# Patient Record
Sex: FEMALE | Race: WHITE | Employment: OTHER | URBAN - METROPOLITAN AREA
[De-identification: names, ages, dates, MRNs, and addresses within clinical notes are randomized per-mention and may not be internally consistent; named-entity substitution may affect disease eponyms.]

---

## 2017-01-04 ENCOUNTER — OFFICE VISIT (OUTPATIENT)
Dept: FAMILY MEDICINE CLINIC | Age: 67
End: 2017-01-04

## 2017-01-04 VITALS
SYSTOLIC BLOOD PRESSURE: 149 MMHG | RESPIRATION RATE: 16 BRPM | WEIGHT: 168 LBS | OXYGEN SATURATION: 96 % | HEART RATE: 66 BPM | TEMPERATURE: 98.3 F | HEIGHT: 63 IN | DIASTOLIC BLOOD PRESSURE: 82 MMHG | BODY MASS INDEX: 29.77 KG/M2

## 2017-01-04 DIAGNOSIS — R05.9 COUGH: Primary | ICD-10-CM

## 2017-01-04 RX ORDER — HYDROCODONE POLISTIREX AND CHLORPHENIRAMINE POLISTIREX 10; 8 MG/5ML; MG/5ML
5 SUSPENSION, EXTENDED RELEASE ORAL
Qty: 70 ML | Refills: 0 | Status: SHIPPED | OUTPATIENT
Start: 2017-01-04 | End: 2017-01-11

## 2017-01-04 NOTE — PATIENT INSTRUCTIONS
Take some Mucinex (guaifenisin) and check out the information below. Use the Tussionex as needed for cough, keeping in mind that it will likely make you drowsy. Cough: Care Instructions  Your Care Instructions  A cough is your body's response to something that bothers your throat or airways. Many things can cause a cough. You might cough because of a cold or the flu, bronchitis, or asthma. Smoking, postnasal drip, allergies, and stomach acid that backs up into your throat also can cause coughs. A cough is a symptom, not a disease. Most coughs stop when the cause, such as a cold, goes away. You can take a few steps at home to cough less and feel better. Follow-up care is a key part of your treatment and safety. Be sure to make and go to all appointments, and call your doctor if you are having problems. It's also a good idea to know your test results and keep a list of the medicines you take. How can you care for yourself at home? · Drink lots of water and other fluids. This helps thin the mucus and soothes a dry or sore throat. Honey or lemon juice in hot water or tea may ease a dry cough. · Take cough medicine as directed by your doctor. · Prop up your head on pillows to help you breathe and ease a dry cough. · Try cough drops to soothe a dry or sore throat. Cough drops don't stop a cough. Medicine-flavored cough drops are no better than candy-flavored drops or hard candy. · Do not smoke. Avoid secondhand smoke. If you need help quitting, talk to your doctor about stop-smoking programs and medicines. These can increase your chances of quitting for good. When should you call for help? Call 911 anytime you think you may need emergency care. For example, call if:  · You have severe trouble breathing. Call your doctor now or seek immediate medical care if:  · You cough up blood. · You have new or worse trouble breathing. · You have a new or higher fever. · You have a new rash.   Watch closely for changes in your health, and be sure to contact your doctor if:  · You cough more deeply or more often, especially if you notice more mucus or a change in the color of your mucus. · You have new symptoms, such as a sore throat, an earache, or sinus pain. · You do not get better as expected. Where can you learn more? Go to http://mynor-ulises.info/. Enter D279 in the search box to learn more about \"Cough: Care Instructions. \"  Current as of: May 27, 2016  Content Version: 11.1  © 7067-8675 [employer]. Care instructions adapted under license by [employer] (which disclaims liability or warranty for this information). If you have questions about a medical condition or this instruction, always ask your healthcare professional. [address] any warranty or liability for your use of this information. Saline Nasal Washes: Care Instructions  Your Care Instructions  Saline nasal washes help keep the nasal passages open by washing out thick or dried mucus. This simple remedy can help relieve symptoms of allergies, sinusitis, and colds. It also can make the nose feel more comfortable by keeping the mucous membranes moist. You may notice a little burning sensation in your nose the first few times you use the solution, but this usually gets better in a few days. Follow-up care is a key part of your treatment and safety. Be sure to make and go to all appointments, and call your doctor if you are having problems. It's also a good idea to know your test results and keep a list of the medicines you take. How can you care for yourself at home? · You can buy premixed saline solution in a squeeze bottle or other sinus rinse products at a drugstore. Read and follow the instructions on the label. · You also can make your own saline solution by adding 1 teaspoon of salt and 1 teaspoon of baking soda to 2 cups of distilled water.   · If you use a homemade solution, pour a small amount into a clean bowl. Using a rubber bulb syringe, squeeze the syringe and place the tip in the salt water. Pull a small amount of the salt water into the syringe by relaxing your hand. · Sit down with your head tilted slightly back. Do not lie down. Put the tip of the bulb syringe or the squeeze bottle a little way into one of your nostrils. Gently drip or squirt a few drops into the nostril. Repeat with the other nostril. Some sneezing and gagging are normal at first.  · Gently blow your nose. · Wipe the syringe or bottle tip clean after each use. · Repeat this 2 or 3 times a day. · Use nasal washes gently if you have nosebleeds often. When should you call for help? Watch closely for changes in your health, and be sure to contact your doctor if:  · You often get nosebleeds. · You have problems doing the nasal washes. Where can you learn more? Go to http://mynor-ulises.info/. Enter 504 [id number] 42 47 in the search box to learn more about \"Saline Nasal Washes: Care Instructions. \"  Current as of: July 29, 2016  Content Version: 11.1  © 3697-3325 [employer]. Care instructions adapted under license by [employer] (which disclaims liability or warranty for this information). If you have questions about a medical condition or this instruction, always ask your healthcare professional. [place] [address] any warranty or liability for your use of this information. DASH Diet: Care Instructions     Your Care Instructions   The DASH diet is an eating plan that can help lower your blood pressure. DASH stands for Dietary Approaches to Stop Hypertension. Hypertension is high blood pressure. The DASH diet focuses on eating foods that are high in calcium, potassium, and magnesium. These nutrients can lower blood pressure. The foods that are highest in these nutrients are fruits, vegetables, dairy products, nuts, seeds, and legumes.  But taking calcium, potassium, and magnesium supplements instead of eating foods that are high in those nutrients does not have the same effect. The DASH diet also includes whole grains, fish, and poultry. The DASH diet is one of several lifestyle changes your doctor may recommend to lower your high blood pressure. Your doctor may also want you to decrease the amount of sodium in your diet. Lowering sodium while following the DASH diet can lower blood pressure even further than just the DASH diet alone. Follow-up care is a key part of your treatment and safety. Be sure to make and go to all appointments, and call your doctor if you are having problems. It's also a good idea to know your test results and keep a list of the medicines you take. How can you care for yourself at home? Following the DASH diet   Eat 4 to 5 servings of fruit each day. A serving is 1 medium-sized piece of fruit, ½ cup chopped or canned fruit, 1/4 cup dried fruit, or 4 ounces (½ cup) of fruit juice. Choose whole fruit much more often than fruit juice. Eat 4 to 5 servings--or more--of vegetables each day. A serving is 1 cup of lettuce or raw leafy vegetables, ½ cup of chopped or cooked vegetables, or 4 ounces (½ cup) of vegetable juice. Choose vegetables more often than vegetable juice. Get 2 to 3 servings of dairy each day, if you tolerate dairy. A serving is 8 ounces of milk, 1 cup of yogurt, or 1 ½ ounces of cheese. Choose whole grains over refined grains if you eat grain-based foods (bread, rice, cereal, pasta). Beware of products labeled \"made with whole grains,\" as this is often a marker of over-processing. Choose products with the highest amount of fiber available. Eat 4 to 5 servings of nuts, seeds, and legumes (cooked dried beans, lentils, and split peas) each week. A serving is 1/3 cup of nuts, 2 tablespoons of seeds, or ½ cup of cooked beans or peas.    Avoid trans fats, hydrogenated oils, and industrial oils such as \"vegetable,\" soy, and canola. Extra virgin olive and coconut oils are great alternatives, as well as nut and seed oils such as flax, walnut, and avocado. Limit sweets and added sugars to 5 servings or less a week. A serving is 1 tablespoon jelly or jam, ½ cup sorbet, or 1 cup of lemonade. Maintain a sensible intake of sodium, keeping in mind that highly processed foods and restaurant foods tend to be high in sodium and low in potassium, magnesium, and calcium. It is important to maintain a balance of these nutrients. http://authoritynutrition.com/how-much-sodium-per-day/        Tips for success   Start small. Do not try to make dramatic changes to your diet all at once. You might feel that you are missing out on your favorite foods and then be more likely to not follow the plan. Make small changes, and stick with them. Once those changes become habit, add a few more changes. Try some of the following:   Make it a goal to eat a fruit or vegetable at every meal and at snacks. This will make it easy to get the recommended amount of fruits and vegetables each day. Try yogurt topped with fruit and nuts for a snack or healthy dessert. Add lettuce, tomato, cucumber, and onion to sandwiches. Combine a ready-made pizza crust with low-fat mozzarella cheese and lots of vegetable toppings. Try using tomatoes, squash, spinach, broccoli, carrots, cauliflower, and onions. Have a variety of cut-up vegetables with a low-fat dip as an appetizer instead of chips and dip. Sprinkle sunflower seeds or chopped almonds over salads. Or try adding chopped walnuts or almonds to cooked vegetables. Try some vegetarian meals using beans and peas. Add garbanzo or kidney beans to salads. Make burritos and tacos with mashed yancey beans or black beans.     http://authoritynutrition.com/how-much-sodium-per-day/

## 2017-01-04 NOTE — MR AVS SNAPSHOT
Visit Information Date & Time Provider Department Dept. Phone Encounter #  
 1/4/2017  2:00 PM Della Hester PA-C [address] [place], [place] [phone] [id number] Follow-up Instructions Return in about 4 weeks (around 2/1/2017), or if symptoms worsen or fail to improve, for blood pressure follow-up. Upcoming Health Maintenance Date Due DTaP/Tdap/Td series (1 - Tdap) 3/13/1973 ZOSTER VACCINE AGE 60> 3/13/2012 INFLUENZA AGE 9 TO ADULT 8/1/2016 BREAST CANCER SCRN MAMMOGRAM 7/13/2017 COLONOSCOPY 10/30/2017 Allergies as of 1/4/2017  Review Complete On: 1/4/2017 By: Danny Gonzalez LPN Severity Noted Reaction Type Reactions Omnicef [Cefdinir]  03/15/2016   Systemic Rash Current Immunizations  Never Reviewed No immunizations on file. Not reviewed this visit You Were Diagnosed With   
  
 Codes Comments Cough    -  Primary ICD-10-CM: N34 ICD-9-CM: 786.2 Elevated blood pressure     ICD-10-CM: I10 
ICD-9-CM: 401.9 Vitals BP Pulse Temp Resp Height(growth percentile) Weight(growth percentile) 149/82 (BP 1 Location: Left arm, BP Patient Position: Sitting) 66 98.3 °F (36.8 °C) (Oral) 16 5' 2.5\" (1.588 m) 168 lb (76.2 kg) SpO2 BMI OB Status Smoking Status 96% 30.24 kg/m2 Hysterectomy Former Smoker Vitals History BMI and BSA Data Body Mass Index Body Surface Area  
 30.24 kg/m 2 1.83 m 2 Preferred Pharmacy Pharmacy Name Phone [id number] [place] [phone] Your Updated Medication List  
  
   
This list is accurate as of: 1/4/17  2:38 PM.  Always use your most recent med list.  
  
  
  
  
 atorvastatin 40 mg tablet Commonly known as:  LIPITOR  
TAKE 1 TABLET BY MOUTH EVERY DAY AT BEDTIME  
  
 chlorpheniramine-HYDROcodone 10-8 mg/5 mL suspension Commonly known as:  Zygmunt Guardian Take 5 mL by mouth every twelve (12) hours as needed for Cough for up to 7 days. Max Daily Amount: 10 mL. EXCEDRIN MIGRAINE PO Take  by mouth. olopatadine 0.6 % Spry Commonly known as:  PATANASE 2 Squirts by Both Nostrils route two (2) times daily as needed (allergies). Prescriptions Printed Refills  
 chlorpheniramine-HYDROcodone (TUSSIONEX) 10-8 mg/5 mL suspension 0 Sig: Take 5 mL by mouth every twelve (12) hours as needed for Cough for up to 7 days. Max Daily Amount: 10 mL. Class: Print Route: Oral  
  
Follow-up Instructions Return in about 4 weeks (around 2/1/2017), or if symptoms worsen or fail to improve, for blood pressure follow-up. Patient Instructions Take some Mucinex (guaifenisin) and check out the information below. Use the Tussionex as needed for cough, keeping in mind that it will likely make you drowsy. Cough: Care Instructions Your Care Instructions A cough is your body's response to something that bothers your throat or airways. Many things can cause a cough. You might cough because of a cold or the flu, bronchitis, or asthma. Smoking, postnasal drip, allergies, and stomach acid that backs up into your throat also can cause coughs. A cough is a symptom, not a disease. Most coughs stop when the cause, such as a cold, goes away. You can take a few steps at home to cough less and feel better. Follow-up care is a key part of your treatment and safety. Be sure to make and go to all appointments, and call your doctor if you are having problems. It's also a good idea to know your test results and keep a list of the medicines you take. How can you care for yourself at home? · Drink lots of water and other fluids. This helps thin the mucus and soothes a dry or sore throat. Honey or lemon juice in hot water or tea may ease a dry cough. · Take cough medicine as directed by your doctor. · Prop up your head on pillows to help you breathe and ease a dry cough. · Try cough drops to soothe a dry or sore throat. Cough drops don't stop a cough. Medicine-flavored cough drops are no better than candy-flavored drops or hard candy. · Do not smoke. Avoid secondhand smoke. If you need help quitting, talk to your doctor about stop-smoking programs and medicines. These can increase your chances of quitting for good. When should you call for help? Call 911 anytime you think you may need emergency care. For example, call if: 
· You have severe trouble breathing. Call your doctor now or seek immediate medical care if: 
· You cough up blood. · You have new or worse trouble breathing. · You have a new or higher fever. · You have a new rash. Watch closely for changes in your health, and be sure to contact your doctor if: 
· You cough more deeply or more often, especially if you notice more mucus or a change in the color of your mucus. · You have new symptoms, such as a sore throat, an earache, or sinus pain. · You do not get better as expected. Where can you learn more? Go to http://mynor-ulises.info/. Enter D279 in the search box to learn more about \"Cough: Care Instructions. \" Current as of: May 27, 2016 Content Version: 11.1 © 7858-9140 [employer]. Care instructions adapted under license by [employer] (which disclaims liability or warranty for this information). If you have questions about a medical condition or this instruction, always ask your healthcare professional. [place] [address] any warranty or liability for your use of this information. Saline Nasal Washes: Care Instructions Your Care Instructions Saline nasal washes help keep the nasal passages open by washing out thick or dried mucus. This simple remedy can help relieve symptoms of allergies, sinusitis, and colds.  It also can make the nose feel more comfortable by keeping the mucous membranes moist. You may notice a little burning sensation in your nose the first few times you use the solution, but this usually gets better in a few days. Follow-up care is a key part of your treatment and safety. Be sure to make and go to all appointments, and call your doctor if you are having problems. It's also a good idea to know your test results and keep a list of the medicines you take. How can you care for yourself at home? · You can buy premixed saline solution in a squeeze bottle or other sinus rinse products at a drugstore. Read and follow the instructions on the label. · You also can make your own saline solution by adding 1 teaspoon of salt and 1 teaspoon of baking soda to 2 cups of distilled water. · If you use a homemade solution, pour a small amount into a clean bowl. Using a rubber bulb syringe, squeeze the syringe and place the tip in the salt water. Pull a small amount of the salt water into the syringe by relaxing your hand. · Sit down with your head tilted slightly back. Do not lie down. Put the tip of the bulb syringe or the squeeze bottle a little way into one of your nostrils. Gently drip or squirt a few drops into the nostril. Repeat with the other nostril. Some sneezing and gagging are normal at first. 
· Gently blow your nose. · Wipe the syringe or bottle tip clean after each use. · Repeat this 2 or 3 times a day. · Use nasal washes gently if you have nosebleeds often. When should you call for help? Watch closely for changes in your health, and be sure to contact your doctor if: 
· You often get nosebleeds. · You have problems doing the nasal washes. Where can you learn more? Go to http://mynor-ulises.info/. Enter [id number] in the search box to learn more about \"Saline Nasal Washes: Care Instructions. \" Current as of: July 29, 2016 Content Version: 11.1 © 4446-4140 [employer]Â®.  Care instructions adapted under license by [facility] [address] (which disclaims liability or warranty for this information). If you have questions about a medical condition or this instruction, always ask your healthcare professional. [address] any warranty or liability for your use of this information. DASH Diet: Care Instructions Your Care Instructions The DASH diet is an eating plan that can help lower your blood pressure. DASH stands for Dietary Approaches to Stop Hypertension. Hypertension is high blood pressure. The DASH diet focuses on eating foods that are high in calcium, potassium, and magnesium. These nutrients can lower blood pressure. The foods that are highest in these nutrients are fruits, vegetables, dairy products, nuts, seeds, and legumes. But taking calcium, potassium, and magnesium supplements instead of eating foods that are high in those nutrients does not have the same effect. The DASH diet also includes whole grains, fish, and poultry. The DASH diet is one of several lifestyle changes your doctor may recommend to lower your high blood pressure. Your doctor may also want you to decrease the amount of sodium in your diet. Lowering sodium while following the DASH diet can lower blood pressure even further than just the DASH diet alone. Follow-up care is a key part of your treatment and safety. Be sure to make and go to all appointments, and call your doctor if you are having problems. It's also a good idea to know your test results and keep a list of the medicines you take. How can you care for yourself at home? Following the DASH diet Eat 4 to 5 servings of fruit each day. A serving is 1 medium-sized piece of fruit, ½ cup chopped or canned fruit, 1/4 cup dried fruit, or 4 ounces (½ cup) of fruit juice. Choose whole fruit much more often than fruit juice. Eat 4 to 5 servings--or more--of vegetables each day.  A serving is 1 cup of lettuce or raw leafy vegetables, ½ cup of chopped or cooked vegetables, or 4 ounces (½ cup) of vegetable juice. Choose vegetables more often than vegetable juice. Get 2 to 3 servings of dairy each day, if you tolerate dairy. A serving is 8 ounces of milk, 1 cup of yogurt, or 1 ½ ounces of cheese. Choose whole grains over refined grains if you eat grain-based foods (bread, rice, cereal, pasta). Beware of products labeled \"made with whole grains,\" as this is often a marker of over-processing. Choose products with the highest amount of fiber available. Eat 4 to 5 servings of nuts, seeds, and legumes (cooked dried beans, lentils, and split peas) each week. A serving is 1/3 cup of nuts, 2 tablespoons of seeds, or ½ cup of cooked beans or peas. Avoid trans fats, hydrogenated oils, and industrial oils such as \"vegetable,\" soy, and canola. Extra virgin olive and coconut oils are great alternatives, as well as nut and seed oils such as flax, walnut, and avocado. Limit sweets and added sugars to 5 servings or less a week. A serving is 1 tablespoon jelly or jam, ½ cup sorbet, or 1 cup of lemonade. Maintain a sensible intake of sodium, keeping in mind that highly processed foods and restaurant foods tend to be high in sodium and low in potassium, magnesium, and calcium. It is important to maintain a balance of these nutrients. http://authoritynutrition.com/how-much-sodium-per-day/ 
 
 
 
Tips for success Start small. Do not try to make dramatic changes to your diet all at once. You might feel that you are missing out on your favorite foods and then be more likely to not follow the plan. Make small changes, and stick with them. Once those changes become habit, add a few more changes. Try some of the following:  
Make it a goal to eat a fruit or vegetable at every meal and at snacks. This will make it easy to get the recommended amount of fruits and vegetables each day. Try yogurt topped with fruit and nuts for a snack or healthy dessert. Add lettuce, tomato, cucumber, and onion to sandwiches. Combine a ready-made pizza crust with low-fat mozzarella cheese and lots of vegetable toppings. Try using tomatoes, squash, spinach, broccoli, carrots, cauliflower, and onions. Have a variety of cut-up vegetables with a low-fat dip as an appetizer instead of chips and dip. Sprinkle sunflower seeds or chopped almonds over salads. Or try adding chopped walnuts or almonds to cooked vegetables. Try some vegetarian meals using beans and peas. Add garbanzo or kidney beans to salads. Make burritos and tacos with mashed yancey beans or black beans. http://authorityBlueshift International Materials.com/how-much-sodium-per-day/ 
 
 
  
Introducing [facility]! Dear Mehdi Rubalcavaost: Thank you for requesting a [employer] account. Our records indicate that you already have an active [employer] account. You can access your account anytime at [email or web address] Did you know that you can access your hospital and ER discharge instructions at any time in [employer]? You can also review all of your test results from your hospital stay or ER visit. Additional Information If you have questions, please visit the Frequently Asked Questions section of the [employer] website at [email or web address] Remember, [employer] is NOT to be used for urgent needs. For medical emergencies, dial 911. Now available from your iPhone and Android! Please provide this summary of care documentation to your next provider. Your primary care clinician is listed as Bo Barr. If you have any questions after today's visit, please call [phone].

## 2017-01-04 NOTE — PROGRESS NOTES
HISTORY OF PRESENT ILLNESS  Eboni Gardner is a 61 y.o. female. HPI Comments: Pt presents with c/o 2 days of cough. 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 She has green nasal drainage. The cough is not productive. Cough   Associated symptoms include shortness of breath (with exertion, chronic). Pertinent negatives include no chest pain and no headaches. Review of Systems   Constitutional: Positive for malaise/fatigue. Negative for chills and fever. HENT: Positive for congestion. Negative for ear pain, sore throat and tinnitus. Eyes: Negative for pain. Respiratory: Positive for cough and shortness of breath (with exertion, chronic). Cardiovascular: Negative for chest pain and palpitations. Gastrointestinal: Positive for diarrhea (yesterday). Negative for nausea and vomiting. Genitourinary: Negative for dysuria and frequency. Musculoskeletal: Negative for joint pain and myalgias. Neurological: Positive for weakness. Negative for dizziness and headaches. Visit Vitals    /82 (BP 1 Location: Left arm, BP Patient Position: Sitting)    Pulse 66    Temp 98.3 °F (36.8 °C) (Oral)    Resp 16    Ht 5' 2.5\" (1.588 m)    Wt 168 lb (76.2 kg)    SpO2 96%    BMI 30.24 kg/m2       Physical Exam   Constitutional: She is oriented to person, place, and time. She appears well-developed and well-nourished. She is cooperative. She does not appear ill. No distress. BP elevated   HENT:   Head: Normocephalic and atraumatic. Right Ear: External ear and ear canal normal.   Left Ear: External ear and ear canal normal.   Nose: Nose normal. No mucosal edema or rhinorrhea. Right sinus exhibits no maxillary sinus tenderness and no frontal sinus tenderness. Left sinus exhibits no maxillary sinus tenderness and no frontal sinus tenderness.    Mouth/Throat: Uvula is midline, oropharynx is clear and moist and mucous membranes are normal.   TMs obscured by cerumen   Eyes: Conjunctivae are normal.   Neck: Neck supple. Cardiovascular: Normal rate, regular rhythm and normal heart sounds. Exam reveals no gallop and no friction rub. Pulses:       Radial pulses are 2+ on the right side, and 2+ on the left side. Pulmonary/Chest: Effort normal and breath sounds normal. She has no decreased breath sounds. She has no wheezes. She has no rhonchi. She has no rales. Lymphadenopathy:     She has no cervical adenopathy. Neurological: She is alert and oriented to person, place, and time. Skin: Skin is warm and dry. Psychiatric: She has a normal mood and affect. Her speech is normal and behavior is normal. Thought content normal.   Nursing note and vitals reviewed. ASSESSMENT and PLAN    ICD-10-CM ICD-9-CM    1. Cough R05 786.2 chlorpheniramine-HYDROcodone (TUSSIONEX) 10-8 mg/5 mL suspension   2. Elevated blood pressure I10 401.9      Recommendations: Take some Mucinex (guaifenisin) and check out the information below. Use the Tussionex as needed for cough, keeping in mind that it will likely make you drowsy. Provided after-visit information on  Cough, Saline Nasal Washes, and DASH diet  Reviewed reasons to return or seek emergency care. Pt verbalized understanding and agreement with the plan of care.     Edgard Rose PA-C

## 2017-01-24 ENCOUNTER — TELEPHONE (OUTPATIENT)
Dept: FAMILY MEDICINE CLINIC | Age: 67
End: 2017-01-24

## 2017-01-24 RX ORDER — OLOPATADINE HYDROCHLORIDE 665 UG/1
2 SPRAY NASAL
Qty: 1 BOTTLE | Refills: 5 | Status: SHIPPED | OUTPATIENT
Start: 2017-01-24 | End: 2017-01-24

## 2017-01-24 RX ORDER — AZELASTINE HCL 205.5 UG/1
1 SPRAY NASAL 2 TIMES DAILY
Qty: 1 BOTTLE | Refills: 3 | Status: SHIPPED | OUTPATIENT
Start: 2017-01-24 | End: 2018-01-03

## 2017-02-20 RX ORDER — ATORVASTATIN CALCIUM 40 MG/1
TABLET, FILM COATED ORAL
Qty: 90 TAB | Refills: 1 | Status: SHIPPED | OUTPATIENT
Start: 2017-02-20 | End: 2017-08-16 | Stop reason: SDUPTHER

## 2017-04-10 ENCOUNTER — OFFICE VISIT (OUTPATIENT)
Dept: FAMILY MEDICINE CLINIC | Age: 67
End: 2017-04-10

## 2017-04-10 VITALS
BODY MASS INDEX: 30.48 KG/M2 | HEART RATE: 54 BPM | WEIGHT: 172 LBS | SYSTOLIC BLOOD PRESSURE: 143 MMHG | TEMPERATURE: 97.1 F | DIASTOLIC BLOOD PRESSURE: 67 MMHG | RESPIRATION RATE: 16 BRPM | HEIGHT: 63 IN | OXYGEN SATURATION: 97 %

## 2017-04-10 DIAGNOSIS — Z00.00 ROUTINE GENERAL MEDICAL EXAMINATION AT A HEALTH CARE FACILITY: Primary | ICD-10-CM

## 2017-04-10 DIAGNOSIS — Z13.31 SCREENING FOR DEPRESSION: ICD-10-CM

## 2017-04-10 DIAGNOSIS — Z78.0 ASYMPTOMATIC MENOPAUSAL STATE: ICD-10-CM

## 2017-04-10 DIAGNOSIS — Z71.89 ADVANCED CARE PLANNING/COUNSELING DISCUSSION: ICD-10-CM

## 2017-04-10 NOTE — MR AVS SNAPSHOT
Visit Information Date & Time Provider Department Dept. Phone Encounter #  
 4/10/2017  8:30 AM Wilber Simpson MD [facility] [phone] [id number] Follow-up Instructions Return if symptoms worsen or fail to improve. Upcoming Health Maintenance Date Due DTaP/Tdap/Td series (1 - Tdap) 3/13/1973 ZOSTER VACCINE AGE 60> 3/13/2012 INFLUENZA AGE 9 TO ADULT 8/1/2016 OSTEOPOROSIS SCREENING (DEXA) 3/13/2017 Pneumococcal 65+ Low/Medium Risk (1 of 2 - PCV13) 3/13/2017 MEDICARE YEARLY EXAM 3/13/2017 BREAST CANCER SCRN MAMMOGRAM 7/13/2017 COLONOSCOPY 10/30/2017 GLAUCOMA SCREENING Q2Y 7/13/2018 Allergies as of 4/10/2017  Review Complete On: 4/10/2017 By: Jaydon Finn LPN Severity Noted Reaction Type Reactions Omnicef [Cefdinir]  03/15/2016   Systemic Rash Current Immunizations  Never Reviewed No immunizations on file. Not reviewed this visit You Were Diagnosed With   
  
 Codes Comments Routine general medical examination at a health care facility    -  Primary ICD-10-CM: Z00.00 ICD-9-CM: V70.0 Asymptomatic menopausal state     ICD-10-CM: Z78.0 ICD-9-CM: V49.81 Advanced care planning/counseling discussion     ICD-10-CM: Z71.89 ICD-9-CM: V65.49 Screening for depression     ICD-10-CM: Z13.89 ICD-9-CM: V79.0 Vitals BP Pulse Temp Resp Height(growth percentile) Weight(growth percentile) 143/67 (BP 1 Location: Right arm, BP Patient Position: Sitting) (!) 54 97.1 °F (36.2 °C) (Oral) 16 5' 2.5\" (1.588 m) 172 lb (78 kg) SpO2 BMI OB Status Smoking Status 97% 30.96 kg/m2 Hysterectomy Former Smoker Vitals History BMI and BSA Data Body Mass Index Body Surface Area 30.96 kg/m 2 1.85 m 2 Preferred Pharmacy Pharmacy Name Phone [id number], [address] [phone] Your Updated Medication List  
  
   
This list is accurate as of: 4/10/17  8:53 AM.  Always use your most recent med list.  
  
  
  
  
 atorvastatin 40 mg tablet Commonly known as:  LIPITOR  
TAKE 1 TABLET BY MOUTH EVERY DAY AT BEDTIME Azelastine 0.15 % (205.5 mcg) nasal spray Commonly known as:  ASTEPRO  
1 Spray by Both Nostrils route two (2) times a day. Indications: PERENNIAL ALLERGIC RHINITIS  
  
 EXCEDRIN MIGRAINE PO Take  by mouth. We Performed the Following [id number] [facility] [ [facility]] DC EKG, SCREEN, INIT PREV EXAM W/ INTERP/REPORT [ [facility]] Follow-up Instructions Return if symptoms worsen or fail to improve. To-Do List   
 04/13/2017 Imaging:  DEXA BONE DENSITY APPENDICULAR Referral Information Referral ID Referred By Referred To  
  
 [id number] Anne ROMERO Not Available Visits Status Start Date End Date 1 New Request 4/10/17 4/10/18 If your referral has a status of pending review or denied, additional information will be sent to support the outcome of this decision. Patient Instructions Get your living will done and get us a copy. I have scheduled a bone density test for you, someone will contact you. You will be due for a mammogram in July and a colonoscopy in October. . 
 
  
Introducing [facility]! Dear Shannan Marie: Thank you for requesting a [employer] account. Our records indicate that you already have an active [employer] account. You can access your account anytime at [email or web address] Did you know that you can access your hospital and ER discharge instructions at any time in [employer]? You can also review all of your test results from your hospital stay or ER visit. Additional Information If you have questions, please visit the Frequently Asked Questions section of the [employer] website at [email or web address] Remember, MyChart is NOT to be used for urgent needs. For medical emergencies, dial 911. Now available from your iPhone and Android! Please provide this summary of care documentation to your next provider. Your primary care clinician is listed as Bo Barr. If you have any questions after today's visit, please call [phone].

## 2017-04-10 NOTE — ACP (ADVANCE CARE PLANNING)
Discussed the importance of having an Advanced Care plan in writing (and in the electronic medical record), and the time to do it is now, when one is able to make the necessary decisions. A copy of the Advance Medical Directive booklet was given to the patient.

## 2017-04-10 NOTE — PROGRESS NOTES
Rm 1  Pt presents to the clinic for a AWV. Flu shot requested: no    Depression Screening Completed: yes    Learning Assessment Completed: yes     Abuse Screening Completed: yes    Health Maintenance reviewed and discussed per provider: yes     Coordination of Care:    1. Have you been to the ER, urgent care clinic since your last visit? Hospitalized since your last visit? no    2. Have you seen or consulted any other health care providers outside of the [address] [place] [address] since your last visit? Include any pap smears or colon screening.  no

## 2017-04-10 NOTE — PATIENT INSTRUCTIONS
Get your living will done and get us a copy. I have scheduled a bone density test for you, someone will contact you. You will be due for a mammogram in July and a colonoscopy in October. Sis Enrique

## 2017-04-10 NOTE — PROGRESS NOTES
Erika Marshall is a 74 y.o. female and presents for annual Medicare Wellness Visit. She is willing to get any health maintenance procedures done, but refuses any and all vaccines. Her mammogram will be due in July, and her colonoscopy in October. Problem List: Reviewed with patient and discussed risk factors. Patient Active Problem List   Diagnosis Code    Hyperlipidemia E78.5    Migraine headache G43.909    Vertigo R42    Microhematuria R31.29       Current medical providers:  Patient Care Team:  Baron Suggs MD as PCP - General (Family Practice)    PSH: Reviewed with patient  Past Surgical History:   Procedure Laterality Date    HX APPENDECTOMY      HX HEENT      wisdom teeth and molar extraction    HX HYSTERECTOMY      partial     HX TONSILLECTOMY          SH: Reviewed with patient  Social History   Substance Use Topics    Smoking status: Former Smoker     Quit date: 1/1/2007    Smokeless tobacco: Never Used    Alcohol use No       FH: Reviewed with patient  Family History   Problem Relation Age of Onset   [facility] Arthritis-osteo Mother     Diabetes Mother     Elevated Lipids Mother     Hypertension Mother     Stroke Mother     Alcohol abuse Father     Cancer Maternal Aunt        Medications/Allergies: Reviewed with patient  Current Outpatient Prescriptions on File Prior to Visit   Medication Sig Dispense Refill    atorvastatin (LIPITOR) 40 mg tablet TAKE 1 TABLET BY MOUTH EVERY DAY AT BEDTIME 90 Tab 1    Azelastine (ASTEPRO) 0.15 % (205.5 mcg) nasal spray 1 [place] by Both Nostrils route two (2) times a day. Indications: PERENNIAL ALLERGIC RHINITIS 1 Bottle 3    ASPIRIN/ACETAMINOPHEN/CAFFEINE (EXCEDRIN MIGRAINE PO) Take  by mouth. No current facility-administered medications on file prior to visit.        Allergies   Allergen Reactions    Omnicef [Cefdinir] Rash       Objective:  Visit Vitals    /67 (BP 1 Location: Right arm, BP Patient Position: Sitting)    Pulse (!) 54  Temp 97.1 °F (36.2 °C) (Oral)    Resp 16    Ht 5' 2.5\" (1.588 m)    Wt 172 lb (78 kg)    SpO2 97%    BMI 30.96 kg/m2    Body mass index is 30.96 kg/(m^2). Assessment of cognitive impairment: Alert and oriented x 3    Depression Screen:   PHQ 2 / 9, over the last two weeks 4/10/2017   Little interest or pleasure in doing things Not at all   Feeling down, depressed or hopeless Not at all   Total Score PHQ 2 0       Fall Risk Assessment:    Fall Risk Assessment, last 12 mths 4/10/2017   Able to walk? Yes   Fall in past 12 months? No       Functional Ability:   Does the patient exhibit a steady gait? yes   How long did it take the patient to get up and walk from a sitting position? 3 secs   Is the patient self reliant?  (ie can do own laundry, meals, household chores)  yes     Does the patient handle his/her own medications? yes     Does the patient handle his/her own money? yes     Is the patients home safe (ie good lighting, handrails on stairs and bath, etc.)? yes     Did you notice or did patient express any hearing difficulties? no     Did you notice or did patient express any vision difficulties?   no     Were distance and reading eye charts used? no       Advance Care Planning:   Patient was offered the opportunity to discuss advance care planning:  yes     Does patient have an Advance Directive:  no   If no, did you provide information on Caring Connections? yes       Plan:      No orders of the defined types were placed in this encounter.       Health Maintenance   Topic Date Due    DTaP/Tdap/Td series (1 - Tdap) 03/13/1973    ZOSTER VACCINE AGE 60>  03/13/2012    INFLUENZA AGE 9 TO ADULT  08/01/2016    GLAUCOMA SCREENING Q2Y  03/13/2017    OSTEOPOROSIS SCREENING (DEXA)  03/13/2017    Pneumococcal 65+ Low/Medium Risk (1 of 2 - PCV13) 03/13/2017    MEDICARE YEARLY EXAM  03/13/2017    BREAST CANCER SCRN MAMMOGRAM  07/13/2017    COLONOSCOPY  10/30/2017    Hepatitis C Screening Completed       *Patient verbalized understanding and agreement with the plan. A copy of the After Visit Summary with personalized health plan was given to the patient today. EKG: non-specific changes, no acute changes. I will get a bone scan, and then the mammogram and colonoscopy when due. She wants to get an Advanced Medical Directive drawn up. Despite my urging, she refused any injections.

## 2017-04-19 ENCOUNTER — HOSPITAL ENCOUNTER (OUTPATIENT)
Dept: GENERAL RADIOLOGY | Age: 67
Discharge: HOME | End: 2017-04-19
Attending: FAMILY MEDICINE
Payer: COMMERCIAL

## 2017-04-19 DIAGNOSIS — Z78.0 ASYMPTOMATIC MENOPAUSAL STATE: ICD-10-CM

## 2017-04-19 PROCEDURE — 77081 DXA BONE DENSITY APPENDICULR: CPT

## 2017-04-20 ENCOUNTER — TELEPHONE (OUTPATIENT)
Dept: FAMILY MEDICINE CLINIC | Age: 67
End: 2017-04-20

## 2017-04-20 RX ORDER — ALENDRONATE SODIUM 70 MG/1
70 TABLET ORAL
Qty: 12 TAB | Refills: 3 | Status: SHIPPED | OUTPATIENT
Start: 2017-04-20 | End: 2018-03-18 | Stop reason: SDUPTHER

## 2017-04-20 NOTE — PROGRESS NOTES
Called patient to inform her Dr. Myrtie Siemens reviewed her bone density scan results and it indicated she has osteopenia, thinning of her bones. Patient was advised Dr. Myrtie Siemens recommends a once weekly supplement. Patient verbalized understanding and had no further questions.

## 2017-05-16 ENCOUNTER — TELEPHONE (OUTPATIENT)
Dept: FAMILY MEDICINE CLINIC | Age: 67
End: 2017-05-16

## 2017-05-16 ENCOUNTER — OFFICE VISIT (OUTPATIENT)
Dept: FAMILY MEDICINE CLINIC | Age: 67
End: 2017-05-16

## 2017-05-16 VITALS
BODY MASS INDEX: 30.3 KG/M2 | TEMPERATURE: 97.1 F | RESPIRATION RATE: 18 BRPM | HEART RATE: 50 BPM | OXYGEN SATURATION: 98 % | WEIGHT: 171 LBS | SYSTOLIC BLOOD PRESSURE: 137 MMHG | DIASTOLIC BLOOD PRESSURE: 76 MMHG | HEIGHT: 63 IN

## 2017-05-16 DIAGNOSIS — M79.671 PAIN IN BOTH FEET: ICD-10-CM

## 2017-05-16 DIAGNOSIS — Z13.31 SCREENING FOR DEPRESSION: ICD-10-CM

## 2017-05-16 DIAGNOSIS — M72.2 PLANTAR FASCIITIS: Primary | ICD-10-CM

## 2017-05-16 DIAGNOSIS — R06.02 SHORTNESS OF BREATH: ICD-10-CM

## 2017-05-16 DIAGNOSIS — M79.672 PAIN IN BOTH FEET: ICD-10-CM

## 2017-05-16 RX ORDER — MELOXICAM 15 MG/1
15 TABLET ORAL
Qty: 30 TAB | Refills: 1 | Status: SHIPPED | OUTPATIENT
Start: 2017-05-16 | End: 2017-07-11 | Stop reason: SDUPTHER

## 2017-05-16 NOTE — PROGRESS NOTES
Rm 2  Pt presents to the clinic complaining of left foot pan. Pt also complains of right foot pain occasionally but the left one hurts worse. Depression Screening Completed: yes    Learning Assessment Completed: yes    Abuse Screening Completed: yes    Health Maintenance reviewed and discussed per provider: yes      Coordination of Care:    1. Have you been to the ER, urgent care clinic since your last visit? Hospitalized since your last visit? no    2. Have you seen or consulted any other health care providers outside of the [address] [place] [address] since your last visit? Include any pap smears or colon screening.  no

## 2017-05-16 NOTE — TELEPHONE ENCOUNTER
Called patient back. Patient stated she is having pain in her heels. Patient stated it feels like she is \"walking on bones\". Patient was advised Dr. Bora Perez recommends an office visit because he can possibly treat her after an evaluation. Patient verbalized understanding and was transferred to the [facility] to schedule an appointment.

## 2017-05-16 NOTE — TELEPHONE ENCOUNTER
Pt is requesting a callback.    Pain in foot unsure if needs to be seen at [facility] or referral for podiatrist.

## 2017-05-16 NOTE — PROGRESS NOTES
HISTORY OF PRESENT ILLNESS  Prashanth Lehman is a 74 y.o. female. HPI Comments: Ms. Aisha Ang is here because of bilateral heel pain for the last couple of months, The L is a lot worse than the R. Pain is present after activity, ok at rest. No known injury. She also mentions that the SOB that she told me about last summer is still present. The symptoms are after exertion (walking around the yard, during sexual relations, etc). She denies chest pain when she gets SOB. She has not smoked for 10 years. Foot Pain   Associated symptoms include shortness of breath. Pertinent negatives include no chest pain, no abdominal pain and no headaches. Review of Systems   Constitutional: Negative for chills and fever. HENT: Negative for sore throat. Eyes: Negative for blurred vision and double vision. Respiratory: Positive for shortness of breath. Negative for cough, hemoptysis, sputum production and wheezing. Cardiovascular: Negative for chest pain and palpitations. Gastrointestinal: Negative for abdominal pain, nausea and vomiting. Musculoskeletal:        Heel pain   Neurological: Negative for headaches. Physical Exam   Constitutional: Vital signs are normal. She appears well-developed and well-nourished. HENT:   Right Ear: Tympanic membrane and ear canal normal.   Left Ear: Tympanic membrane and ear canal normal.   Nose: Nose normal.   Mouth/Throat: Uvula is midline, oropharynx is clear and moist and mucous membranes are normal.   Eyes: Pupils are equal, round, and reactive to light. Cardiovascular: Normal rate and regular rhythm. Pulmonary/Chest: Effort normal and breath sounds normal. No respiratory distress. She has no rales. Abdominal: She exhibits no distension. Musculoskeletal:   Very tender medial L heel, mildly tender medial R heel. No swelling or bruising. Skin: No rash noted. Nursing note and vitals reviewed.       ASSESSMENT and PLAN    ICD-10-CM ICD-9-CM    1. Plantar fasciitis M72.2 728.71 meloxicam (MOBIC) 15 mg tablet      XR CALCANEUS LT      XR CALCANEUS RT   2. Pain in both feet M79.671 729.5 meloxicam (MOBIC) 15 mg tablet    M79.672  XR CALCANEUS LT      XR CALCANEUS RT   3.  Shortness of breath R06.02 786.05 REFERRAL TO PULMONARY DISEASE       AVS instructions reviewed with patient, pt verbalized understanding

## 2017-05-16 NOTE — MR AVS SNAPSHOT
Visit Information Date & Time Provider Department Dept. Phone Encounter #  
 5/16/2017 11:00 AM Helen Valadez, [facility] [address] [phone] [id number] Upcoming Health Maintenance Date Due DTaP/Tdap/Td series (1 - Tdap) 3/13/1973 ZOSTER VACCINE AGE 60> 3/13/2012 Pneumococcal 65+ Low/Medium Risk (1 of 2 - PCV13) 3/13/2017 BREAST CANCER SCRN MAMMOGRAM 7/13/2017 COLONOSCOPY 10/30/2017 INFLUENZA AGE 9 TO ADULT 8/1/2017 MEDICARE YEARLY EXAM 4/11/2018 GLAUCOMA SCREENING Q2Y 7/13/2018 Allergies as of 5/16/2017  Review Complete On: 5/16/2017 By: Helen Valadez MD  
  
 Severity Noted Reaction Type Reactions Omnicef [Cefdinir]  03/15/2016   Systemic Rash Current Immunizations  Never Reviewed No immunizations on file. Not reviewed this visit You Were Diagnosed With   
  
 Codes Comments Plantar fasciitis    -  Primary ICD-10-CM: M72.2 ICD-9-CM: 728.71 Pain in both feet     ICD-10-CM: M79.671, C46.264 ICD-9-CM: 729.5 Vitals BP Pulse Temp Resp Height(growth percentile) Weight(growth percentile)  
 137/76 (BP 1 Location: Left arm, BP Patient Position: Sitting) (!) 50 97.1 °F (36.2 °C) (Oral) 18 5' 2.5\" (1.588 m) 171 lb (77.6 kg) SpO2 BMI OB Status Smoking Status 98% 30.78 kg/m2 Hysterectomy Former Smoker Vitals History BMI and BSA Data Body Mass Index Body Surface Area 30.78 kg/m 2 1.85 m 2 Preferred Pharmacy Pharmacy Name Phone [id number] [address] [phone] Your Updated Medication List  
  
   
This list is accurate as of: 5/16/17 11:37 AM.  Always use your most recent med list.  
  
  
  
  
 alendronate 70 mg tablet Commonly known as:  FOSAMAX Take 1 Tab by mouth every seven (7) days. atorvastatin 40 mg tablet Commonly known as:  LIPITOR TAKE 1 TABLET BY MOUTH EVERY DAY AT BEDTIME Azelastine 0.15 % (205.5 mcg) nasal spray Commonly known as:  ASTEPRO  
1 Spray by Both Nostrils route two (2) times a day. Indications: PERENNIAL ALLERGIC RHINITIS  
  
 EXCEDRIN MIGRAINE PO Take  by mouth.  
  
 meloxicam 15 mg tablet Commonly known as:  MOBIC Take 1 Tab by mouth daily as needed (inflammation). Prescriptions Sent to Pharmacy Refills  
 meloxicam (MOBIC) 15 mg tablet 1 Sig: Take 1 Tab by mouth daily as needed (inflammation). Class: Normal  
 Pharmacy: [address] [place] [address] #: [phone] Route: Oral  
  
To-Do List   
 05/16/2017 Imaging:  XR CALCANEUS LT   
  
 05/16/2017 Imaging:  XR CALCANEUS RT Patient Instructions Take the anti-inflammatory medication as prescribed. Do the foot exercises daily religiously. Apply ice to the heels after overuse (long periods of being on your feet, after exercise, etc). Notify me if it doesn't go away. Plantar Fasciitis: Care Instructions Your Care Instructions Plantar fasciitis is pain and inflammation of the plantar fascia, the tissue at the bottom of your foot that connects the heel bone to the toes. The plantar fascia also supports the arch. If you strain the plantar fascia, it can develop small tears and cause heel pain when you stand or walk. Plantar fasciitis can be caused by running or other sports. It also may occur in people who are overweight or who have high arches or flat feet. You may get plantar fasciitis if you walk or stand for long periods, or have a tight Achilles tendon or calf muscles. You can improve your foot pain with rest and other care at home. It might take a few weeks to a few months for your foot to heal completely. Follow-up care is a key part of your treatment and safety.  Be sure to make and go to all appointments, and call your doctor if you are having problems. It's also a good idea to know your test results and keep a list of the medicines you take. How can you care for yourself at home? · Rest your feet often. Reduce your activity to a level that lets you avoid pain. If possible, do not run or walk on hard surfaces. · Take pain medicines exactly as directed. ¨ If the doctor gave you a prescription medicine for pain, take it as prescribed. ¨ If you are not taking a prescription pain medicine, take an over-the-counter anti-inflammatory medicine for pain and swelling, such as ibuprofen (Advil, Motrin) or naproxen (Aleve). Read and follow all instructions on the label. · Use ice massage to help with pain and swelling. You can use an ice cube or an ice cup several times a day. To make an ice cup, fill a paper cup with water and freeze it. Cut off the top of the cup until a half-inch of ice shows. Hold onto the remaining paper to use the cup. Rub the ice in small circles over the area for 5 to 7 minutes. · Contrast baths, which alternate hot and cold water, can also help reduce swelling. But because heat alone may make pain and swelling worse, end a contrast bath with a soak in cold water. · Wear a night splint if your doctor suggests it. A night splint holds your foot with the toes pointed up and the foot and ankle at a 90-degree angle. This position gives the bottom of your foot a constant, gentle stretch. · Do simple exercises such as calf stretches and towel stretches 2 to 3 times each day, especially when you first get up in the morning. These can help the plantar fascia become more flexible. They also make the muscles that support your arch stronger. Hold these stretches for 15 to 30 seconds per stretch. Repeat 2 to 4 times. ¨ Stand about 1 foot from a wall. Place the palms of both hands against the wall at chest level.  Lean forward against the wall, keeping one leg with the knee straight and heel on the ground while bending the knee of the other leg. ¨ Sit down on the floor or a mat with your feet stretched in front of you. Roll up a towel lengthwise, and loop it over the ball of your foot. Holding the towel at both ends, gently pull the towel toward you to stretch your foot. · Wear shoes with good arch support. Athletic shoes or shoes with a well-cushioned sole are good choices. · Try heel cups or shoe inserts (orthotics) to help cushion your heel. You can buy these at many shoe stores. · Put on your shoes as soon as you get out of bed. Going barefoot or wearing slippers may make your pain worse. · Reach and stay at a good weight for your height. This puts less strain on your feet. When should you call for help? Call your doctor now or seek immediate medical care if: 
· You have heel pain with fever, redness, or warmth in your heel. · You cannot put weight on the sore foot. Watch closely for changes in your health, and be sure to contact your doctor if: 
· You have numbness or tingling in your heel. · Your heel pain lasts more than 2 weeks. Where can you learn more? Go to http://mynor-ulises.info/. Isa Lozano in the search box to learn more about \"Plantar Fasciitis: Care Instructions. \" Current as of: May 23, 2016 Content Version: 11.2 © 5649-2731 [employer]. Care instructions adapted under license by [employer] (which disclaims liability or warranty for this information). If you have questions about a medical condition or this instruction, always ask your healthcare professional. [place] [address] any warranty or liability for your use of this information. Plantar Fasciitis: Exercises Your Care Instructions Here are some examples of typical rehabilitation exercises for your condition. Start each exercise slowly. Ease off the exercise if you start to have pain. Your doctor or physical therapist will tell you when you can start these exercises and which ones will work best for you. How to do the exercises Note: Each exercise should create a pulling feeling but should not cause pain. Towel stretch 1. Sit with your legs extended and knees straight. 2. Place a towel around your foot just under the toes. 3. Hold each end of the towel in each hand, with your hands above your knees. 4. Pull back with the towel so that your foot stretches toward you. 5. Hold the position for at least 15 to 30 seconds. 6. Repeat 2 to 4 times a session, up to 5 sessions a day. Calf stretch Note: This exercise stretches the muscles at the back of the lower leg (the calf) and the Achilles tendon. Do this exercise 3 or 4 times a day, 5 days a week. 1. Stand facing a wall with your hands on the wall at about eye level. Put the leg you want to stretch about a step behind your other leg. 2. Keeping your back heel on the floor, bend your front knee until you feel a stretch in the back leg. 3. Hold the stretch for 15 to 30 seconds. Repeat 2 to 4 times. Plantar fascia and calf stretch Note: Stretching the plantar fascia and calf muscles can increase flexibility and decrease heel pain. You can do this exercise several times each day and before and after activity. 1. Stand on a step as shown above. Be sure to hold on to the banister. 2. Slowly let your heels down over the edge of the step as you relax your calf muscles. You should feel a gentle stretch across the bottom of your foot and up the back of your leg to your knee. 3. Hold the stretch about 15 to 30 seconds, and then tighten your calf muscle a little to bring your heel back up to the level of the step. Repeat 2 to 4 times. Towel curls 1. While sitting, place your foot on a towel on the floor and scrunch the towel toward you with your toes. 2. Then, also using your toes, push the towel away from you. Note: Make this exercise more challenging by placing a weighted object, such as a soup can, on the other end of the towel. [place] pickups 1. Put marbles on the floor next to a cup. 
2. Using your toes, try to lift the marbles up from the floor and put them in the cup. Follow-up care is a key part of your treatment and safety. Be sure to make and go to all appointments, and call your doctor if you are having problems. It's also a good idea to know your test results and keep a list of the medicines you take. Where can you learn more? Go to http://mynor-ulises.info/. alisa Burns in the search box to learn more about \"Plantar Fasciitis: Exercises. \" Current as of: May 23, 2016 Content Version: 11.2 © 5372-9780 [employer]. Care instructions adapted under license by [employer] (which disclaims liability or warranty for this information). If you have questions about a medical condition or this instruction, always ask your healthcare professional. [address] any warranty or liability for your use of this information. Introducing [facility]! Dear Shannan Marie: Thank you for requesting a [employer] account. Our records indicate that you already have an active [employer] account. You can access your account anytime at [email or web address] Did you know that you can access your hospital and ER discharge instructions at any time in [employer]? You can also review all of your test results from your hospital stay or ER visit. Additional Information If you have questions, please visit the Frequently Asked Questions section of the [employer] website at [email or web address] Remember, [employer] is NOT to be used for urgent needs. For medical emergencies, dial 911. Now available from your iPhone and Android! Please provide this summary of care documentation to your next provider. Your primary care clinician is listed as Bo Barr. If you have any questions after today's visit, please call [phone].

## 2017-05-16 NOTE — PATIENT INSTRUCTIONS
Take the anti-inflammatory medication as prescribed. Do the foot exercises daily religiously. Apply ice to the heels after overuse (long periods of being on your feet, after exercise, etc). Notify me if it doesn't go away. Plantar Fasciitis: Care Instructions  Your Care Instructions    Plantar fasciitis is pain and inflammation of the plantar fascia, the tissue at the bottom of your foot that connects the heel bone to the toes. The plantar fascia also supports the arch. If you strain the plantar fascia, it can develop small tears and cause heel pain when you stand or walk. Plantar fasciitis can be caused by running or other sports. It also may occur in people who are overweight or who have high arches or flat feet. You may get plantar fasciitis if you walk or stand for long periods, or have a tight Achilles tendon or calf muscles. You can improve your foot pain with rest and other care at home. It might take a few weeks to a few months for your foot to heal completely. Follow-up care is a key part of your treatment and safety. Be sure to make and go to all appointments, and call your doctor if you are having problems. It's also a good idea to know your test results and keep a list of the medicines you take. How can you care for yourself at home? · Rest your feet often. Reduce your activity to a level that lets you avoid pain. If possible, do not run or walk on hard surfaces. · Take pain medicines exactly as directed. ¨ If the doctor gave you a prescription medicine for pain, take it as prescribed. ¨ If you are not taking a prescription pain medicine, take an over-the-counter anti-inflammatory medicine for pain and swelling, such as ibuprofen (Advil, Motrin) or naproxen (Aleve). Read and follow all instructions on the label. · Use ice massage to help with pain and swelling. You can use an ice cube or an ice cup several times a day. To make an ice cup, fill a paper cup with water and freeze it. Cut off the top of the cup until a half-inch of ice shows. Hold onto the remaining paper to use the cup. Rub the ice in small circles over the area for 5 to 7 minutes. · Contrast baths, which alternate hot and cold water, can also help reduce swelling. But because heat alone may make pain and swelling worse, end a contrast bath with a soak in cold water. · Wear a night splint if your doctor suggests it. A night splint holds your foot with the toes pointed up and the foot and ankle at a 90-degree angle. This position gives the bottom of your foot a constant, gentle stretch. · Do simple exercises such as calf stretches and towel stretches 2 to 3 times each day, especially when you first get up in the morning. These can help the plantar fascia become more flexible. They also make the muscles that support your arch stronger. Hold these stretches for 15 to 30 seconds per stretch. Repeat 2 to 4 times. ¨ Stand about 1 foot from a wall. Place the palms of both hands against the wall at chest level. Lean forward against the wall, keeping one leg with the knee straight and heel on the ground while bending the knee of the other leg. ¨ Sit down on the floor or a mat with your feet stretched in front of you. Roll up a towel lengthwise, and loop it over the ball of your foot. Holding the towel at both ends, gently pull the towel toward you to stretch your foot. · Wear shoes with good arch support. Athletic shoes or shoes with a well-cushioned sole are good choices. · Try heel cups or shoe inserts (orthotics) to help cushion your heel. You can buy these at many shoe stores. · Put on your shoes as soon as you get out of bed. Going barefoot or wearing slippers may make your pain worse. · Reach and stay at a good weight for your height. This puts less strain on your feet. When should you call for help?   Call your doctor now or seek immediate medical care if:  · You have heel pain with fever, redness, or warmth in your heel.  · You cannot put weight on the sore foot. Watch closely for changes in your health, and be sure to contact your doctor if:  · You have numbness or tingling in your heel. · Your heel pain lasts more than 2 weeks. Where can you learn more? Go to http://mynor-ulises.info/. Jose Guerrero in the search box to learn more about \"Plantar Fasciitis: Care Instructions. \"  Current as of: May 23, 2016  Content Version: 11.2  © 1804-6525 Stuffle. Care instructions adapted under license by [employer] (which disclaims liability or warranty for this information). If you have questions about a medical condition or this instruction, always ask your healthcare professional. [address] any warranty or liability for your use of this information. Plantar Fasciitis: Exercises  Your Care Instructions  Here are some examples of typical rehabilitation exercises for your condition. Start each exercise slowly. Ease off the exercise if you start to have pain. Your doctor or physical therapist will tell you when you can start these exercises and which ones will work best for you. How to do the exercises  Note: Each exercise should create a pulling feeling but should not cause pain. Towel stretch    1. Sit with your legs extended and knees straight. 2. Place a towel around your foot just under the toes. 3. Hold each end of the towel in each hand, with your hands above your knees. 4. Pull back with the towel so that your foot stretches toward you. 5. Hold the position for at least 15 to 30 seconds. 6. Repeat 2 to 4 times a session, up to 5 sessions a day. Calf stretch    Note: This exercise stretches the muscles at the back of the lower leg (the calf) and the Achilles tendon. Do this exercise 3 or 4 times a day, 5 days a week. 1. Stand facing a wall with your hands on the wall at about eye level.  Put the leg you want to stretch about a step behind your other leg. 2. Keeping your back heel on the floor, bend your front knee until you feel a stretch in the back leg. 3. Hold the stretch for 15 to 30 seconds. Repeat 2 to 4 times. Plantar fascia and calf stretch    Note: Stretching the plantar fascia and calf muscles can increase flexibility and decrease heel pain. You can do this exercise several times each day and before and after activity. 1. Stand on a step as shown above. Be sure to hold on to the banister. 2. Slowly let your heels down over the edge of the step as you relax your calf muscles. You should feel a gentle stretch across the bottom of your foot and up the back of your leg to your knee. 3. Hold the stretch about 15 to 30 seconds, and then tighten your calf muscle a little to bring your heel back up to the level of the step. Repeat 2 to 4 times. Towel curls    1. While sitting, place your foot on a towel on the floor and scrunch the towel toward you with your toes. 2. Then, also using your toes, push the towel away from you. Note: Make this exercise more challenging by placing a weighted object, such as a soup can, on the other end of the towel. [place] pickups    1. Put marbles on the floor next to a cup.  2. Using your toes, try to lift the marbles up from the floor and put them in the cup. Follow-up care is a key part of your treatment and safety. Be sure to make and go to all appointments, and call your doctor if you are having problems. It's also a good idea to know your test results and keep a list of the medicines you take. Where can you learn more? Go to http://mynor-ulises.info/. Saw Dobson in the search box to learn more about \"Plantar Fasciitis: Exercises. \"  Current as of: May 23, 2016  Content Version: 11.2  © 1424-3237 [employer]. Care instructions adapted under license by [employer] (which disclaims liability or warranty for this information).  If you have questions about a medical condition or this instruction, always ask your healthcare professional. [place] [address] any warranty or liability for your use of this information.

## 2017-06-12 DIAGNOSIS — R06.02 SOB (SHORTNESS OF BREATH): Primary | ICD-10-CM

## 2017-06-12 NOTE — PROGRESS NOTES
Verbal Order with read back per Dr. Eyad Garner MD  For PFT smart panel. AMB POC PFT complete w/ bronchodilator  AMB POC PFT complete w/o bronchodilator    Dr. Eyad Garner MD will co-sign the orders.

## 2017-06-23 ENCOUNTER — TELEPHONE (OUTPATIENT)
Dept: FAMILY MEDICINE CLINIC | Age: 67
End: 2017-06-23

## 2017-06-23 ENCOUNTER — OFFICE VISIT (OUTPATIENT)
Dept: PULMONOLOGY | Age: 67
End: 2017-06-23

## 2017-06-23 VITALS
TEMPERATURE: 97.8 F | BODY MASS INDEX: 30.12 KG/M2 | DIASTOLIC BLOOD PRESSURE: 80 MMHG | OXYGEN SATURATION: 97 % | SYSTOLIC BLOOD PRESSURE: 140 MMHG | RESPIRATION RATE: 19 BRPM | HEART RATE: 58 BPM | HEIGHT: 63 IN | WEIGHT: 170 LBS

## 2017-06-23 DIAGNOSIS — G43.909 MIGRAINE WITHOUT STATUS MIGRAINOSUS, NOT INTRACTABLE, UNSPECIFIED MIGRAINE TYPE: ICD-10-CM

## 2017-06-23 DIAGNOSIS — R06.02 SOB (SHORTNESS OF BREATH): ICD-10-CM

## 2017-06-23 DIAGNOSIS — Z12.11 COLON CANCER SCREENING: Primary | ICD-10-CM

## 2017-06-23 DIAGNOSIS — J98.01 BRONCHOSPASM: Primary | ICD-10-CM

## 2017-06-23 RX ORDER — ALBUTEROL SULFATE 90 UG/1
2 AEROSOL, METERED RESPIRATORY (INHALATION)
Qty: 1 INHALER | Refills: 5 | Status: SHIPPED | OUTPATIENT
Start: 2017-06-23

## 2017-06-23 NOTE — MR AVS SNAPSHOT
Visit Information Date & Time Provider Department Dept. Phone Encounter #  
 6/23/2017 11:15 AM MD Priscilla Smith [facility] Roc Padilla [id number] Upcoming Health Maintenance Date Due DTaP/Tdap/Td series (1 - Tdap) 3/13/1973 ZOSTER VACCINE AGE 60> 3/13/2012 Pneumococcal 65+ Low/Medium Risk (1 of 2 - PCV13) 3/13/2017 BREAST CANCER SCRN MAMMOGRAM 7/13/2017 COLONOSCOPY 10/30/2017 INFLUENZA AGE 9 TO ADULT 8/1/2017 MEDICARE YEARLY EXAM 4/11/2018 GLAUCOMA SCREENING Q2Y 7/13/2018 Allergies as of 6/23/2017  Review Complete On: 6/23/2017 By: Christian Baltazar MD  
  
 Severity Noted Reaction Type Reactions Omnicef [Cefdinir]  03/15/2016   Systemic Rash Current Immunizations  Never Reviewed No immunizations on file. Not reviewed this visit You Were Diagnosed With   
  
 Codes Comments Bronchospasm    -  Primary ICD-10-CM: J98.01 
ICD-9-CM: 519.11   
 SOB (shortness of breath)     ICD-10-CM: R06.02 
ICD-9-CM: 786.05 Migraine without status migrainosus, not intractable, unspecified migraine type     ICD-10-CM: G43.909 ICD-9-CM: 346.90 Vitals BP Pulse Temp Resp Height(growth percentile) Weight(growth percentile) 140/80 (BP 1 Location: Left arm, BP Patient Position: At rest) (!) 58 97.8 °F (36.6 °C) (Oral) 19 5' 3\" (1.6 m) 170 lb (77.1 kg) SpO2 BMI OB Status Smoking Status 97% 30.11 kg/m2 Hysterectomy Former Smoker BMI and BSA Data Body Mass Index Body Surface Area  
 30.11 kg/m 2 1.85 m 2 Preferred Pharmacy Pharmacy Name Phone [id number] [address] [phone] Your Updated Medication List  
  
   
This list is accurate as of: 6/23/17 11:32 AM.  Always use your most recent med list.  
  
  
  
  
 albuterol 90 mcg/actuation inhaler Commonly known as:  PROVENTIL HFA, VENTOLIN HFA, PROAIR HFA Take 2 Puffs by inhalation every four (4) hours as needed for Wheezing or Shortness of Breath. alendronate 70 mg tablet Commonly known as:  FOSAMAX Take 1 Tab by mouth every seven (7) days. atorvastatin 40 mg tablet Commonly known as:  LIPITOR  
TAKE 1 TABLET BY MOUTH EVERY DAY AT BEDTIME Azelastine 0.15 % (205.5 mcg) nasal spray Commonly known as:  ASTEPRO  
1 Spray by Both Nostrils route two (2) times a day. Indications: PERENNIAL ALLERGIC RHINITIS  
  
 EXCEDRIN MIGRAINE PO Take  by mouth.  
  
 meloxicam 15 mg tablet Commonly known as:  MOBIC Take 1 Tab by mouth daily as needed (inflammation). ONE DAILY VITAMIN PO Take  by mouth. Prescriptions Sent to Pharmacy Refills  
 albuterol (PROVENTIL HFA, VENTOLIN HFA, PROAIR HFA) 90 mcg/actuation inhaler 5 Sig: Take 2 Puffs by inhalation every four (4) hours as needed for Wheezing or Shortness of Breath. Class: Normal  
 Pharmacy: [address] [place] [address] #: [phone] Route: Inhalation We Performed the Following AMB POC SPIROMETRY W/BRONCHODILATOR [47035 CPT(R)] Introducing [facility]! Dear Maulik Simental: Thank you for requesting a [employer] account. Our records indicate that you already have an active [employer] account. You can access your account anytime at [email or web address] Did you know that you can access your hospital and ER discharge instructions at any time in [employer]? You can also review all of your test results from your hospital stay or ER visit. Additional Information If you have questions, please visit the Frequently Asked Questions section of the [employer] website at [email or web address] Remember, [employer] is NOT to be used for urgent needs. For medical emergencies, dial 911. Now available from your iPhone and Android! Please provide this summary of care documentation to your next provider. Your primary care clinician is listed as Bo Barr. If you have any questions after today's visit, please call [phone].

## 2017-06-23 NOTE — PROGRESS NOTES
HISTORY OF PRESENT ILLNESS  Kae Olivas is a 74 y.o. female. HPI Comments: Referred by Dr. Manda Rey for SOB of a few years duration. Associated with dry cough and wheezing as well as chest tightness but not chest pain. Other triggers include strong chemical smells used while house cleaning. Pt denies hemoptysis, fever or chills. Rest of history per ROS. Shortness of Breath   The history is provided by the patient. This is a chronic problem. The problem occurs intermittently. Episode onset: 2 years. The problem has been gradually worsening. Associated symptoms include headaches, cough and wheezing. Pertinent negatives include no fever, no coryza, no rhinorrhea, no sore throat, no swollen glands, no ear pain, no neck pain, no sputum production, no hemoptysis, no PND, no orthopnea, no chest pain, no syncope, no vomiting, no abdominal pain, no rash, no leg pain, no leg swelling and no claudication. The problem's precipitants include weather/humidity (walking 100 yards or up 2 flights of stairs). Risk factors include smoking. She has tried nothing for the symptoms. She has had no prior hospitalizations. She has had no prior ED visits. She has had no prior ICU admissions. Review of Systems   Constitutional: Negative for chills, diaphoresis, fever, malaise/fatigue and weight loss. HENT: Negative for congestion, ear discharge, ear pain, hearing loss, nosebleeds, rhinorrhea, sore throat and tinnitus. Eyes: Negative for blurred vision, double vision, photophobia, pain, discharge and redness. Respiratory: Positive for cough, shortness of breath and wheezing. Negative for hemoptysis, sputum production and stridor. Cardiovascular: Negative for chest pain, palpitations, orthopnea, claudication, leg swelling, syncope and PND. Gastrointestinal: Negative for abdominal pain, blood in stool, constipation, diarrhea, heartburn, melena, nausea and vomiting.    Genitourinary: Negative for dysuria, flank pain, frequency, hematuria and urgency. Musculoskeletal: Negative for back pain, falls, joint pain, myalgias and neck pain. Skin: Negative for itching and rash. Neurological: Positive for headaches. Negative for dizziness, tingling, tremors, sensory change, speech change, focal weakness, seizures, loss of consciousness and weakness. Endo/Heme/Allergies: Negative for environmental allergies. Does not bruise/bleed easily. Psychiatric/Behavioral: Negative for depression, hallucinations, memory loss, substance abuse and suicidal ideas. The patient is not nervous/anxious and does not have insomnia. Past Medical History:   Diagnosis Date    Common migraine     Hematuria     Hx of vertigo     Hypercholesterolemia      Past Surgical History:   Procedure Laterality Date    HX APPENDECTOMY      HX HEENT      wisdom teeth and molar extraction    HX HYSTERECTOMY      partial     HX TONSILLECTOMY       Current Outpatient Prescriptions on File Prior to Visit   Medication Sig Dispense Refill    meloxicam (MOBIC) 15 mg tablet Take 1 Tab by mouth daily as needed (inflammation). 30 Tab 1    alendronate (FOSAMAX) 70 mg tablet Take 1 Tab by mouth every seven (7) days. 12 Tab 3    atorvastatin (LIPITOR) 40 mg tablet TAKE 1 TABLET BY MOUTH EVERY DAY AT BEDTIME 90 Tab 1    ASPIRIN/ACETAMINOPHEN/CAFFEINE (EXCEDRIN MIGRAINE PO) Take  by mouth.  Azelastine (ASTEPRO) 0.15 % (205.5 mcg) nasal spray 1 [place] by Both Nostrils route two (2) times a day. Indications: PERENNIAL ALLERGIC RHINITIS 1 Bottle 3     No current facility-administered medications on file prior to visit.       Allergies   Allergen Reactions    Omnicef [Cefdinir] Rash     Family History   Problem Relation Age of Onset   [facility] Arthritis-osteo Mother     Diabetes Mother     Elevated Lipids Mother     Hypertension Mother     Stroke Mother     Alcohol abuse Father     Cancer Maternal Aunt      Social History     Social History    Marital status:      Spouse name: N/A    Number of children: N/A    Years of education: N/A     Occupational History    Not on file. Social History Main Topics    Smoking status: Former Smoker     Packs/day: 1.50     Years: 37.00     Types: Cigarettes     Quit date: 1/1/2007    Smokeless tobacco: Never Used    Alcohol use No    Drug use: No    Sexual activity: Yes     Partners: Male     Birth control/ protection: None     Other Topics Concern    Not on file     Social History Narrative     Blood pressure 140/80, pulse (!) 58, temperature 97.8 °F (36.6 °C), temperature source Oral, resp. rate 19, height 5' 3\" (1.6 m), weight 77.1 kg (170 lb), SpO2 97 %. See nurse note for ambulatory oximetry    Physical Exam   Constitutional: She is oriented to person, place, and time. She appears well-developed. No distress. Overweight    HENT:   Head: Normocephalic and atraumatic. Nose: Nose normal.   Mouth/Throat: Oropharynx is clear and moist. No oropharyngeal exudate. Poor dentition   Eyes: Conjunctivae are normal. Pupils are equal, round, and reactive to light. Right eye exhibits no discharge. Left eye exhibits no discharge. No scleral icterus. Neck: No JVD present. No tracheal deviation present. No thyromegaly present. Cardiovascular: Normal rate, regular rhythm, normal heart sounds and intact distal pulses. Exam reveals no gallop and no friction rub. No murmur heard. Pulmonary/Chest: Effort normal and breath sounds normal. No stridor. No respiratory distress. She has no wheezes. She has no rales. She exhibits no tenderness. Abdominal: Soft. She exhibits no mass. There is no tenderness. Musculoskeletal: She exhibits no edema or tenderness. Lymphadenopathy:     She has no cervical adenopathy. Neurological: She is alert and oriented to person, place, and time. Skin: Skin is warm and dry. No rash noted. She is not diaphoretic. No erythema. Psychiatric: She has a normal mood and affect.  Her behavior is normal. Judgment and thought content normal.     Spirometry: mild obstruction suspected with small airways involvement  ASSESSMENT and PLAN  Encounter Diagnoses   Name Primary?  Bronchospasm Yes    SOB (shortness of breath)     Migraine without status migrainosus, not intractable, unspecified migraine type      SOB possibly due to bronchospasm, suspect early COPD vs RAD vs Asthma. As symptoms are intermittent and infrequent would start pt on prn Albuterol. Inhaler technique demonstrated to pt. Will follow up in 3 months and reassess need for maintenance medication then. Commended pt on smoking cessation.

## 2017-06-23 NOTE — TELEPHONE ENCOUNTER
Patient called to inform her Dr. Fran Pérez received notification she is due for a colonoscopy soon. Patient was informed Dr. Fran Pérez would like to refer her. Patient stated she would like the referral. Patient verbalized understanding and had no further questions.

## 2017-07-03 ENCOUNTER — TELEPHONE (OUTPATIENT)
Dept: SURGERY | Age: 67
End: 2017-07-03

## 2017-07-11 DIAGNOSIS — M79.671 PAIN IN BOTH FEET: ICD-10-CM

## 2017-07-11 DIAGNOSIS — M72.2 PLANTAR FASCIITIS: ICD-10-CM

## 2017-07-11 DIAGNOSIS — M79.672 PAIN IN BOTH FEET: ICD-10-CM

## 2017-07-11 RX ORDER — MELOXICAM 15 MG/1
TABLET ORAL
Qty: 30 TAB | Refills: 1 | Status: SHIPPED | OUTPATIENT
Start: 2017-07-11 | End: 2017-09-03 | Stop reason: SDUPTHER

## 2017-07-20 ENCOUNTER — OFFICE VISIT (OUTPATIENT)
Dept: SURGERY | Age: 67
End: 2017-07-20

## 2017-07-20 VITALS
WEIGHT: 171 LBS | BODY MASS INDEX: 30.3 KG/M2 | HEIGHT: 63 IN | OXYGEN SATURATION: 97 % | RESPIRATION RATE: 20 BRPM | SYSTOLIC BLOOD PRESSURE: 138 MMHG | TEMPERATURE: 98 F | DIASTOLIC BLOOD PRESSURE: 71 MMHG | HEART RATE: 64 BPM

## 2017-07-20 DIAGNOSIS — Z12.11 ENCOUNTER FOR SCREENING COLONOSCOPY: Primary | ICD-10-CM

## 2017-07-20 RX ORDER — POLYETHYLENE GLYCOL 3350, SODIUM SULFATE ANHYDROUS, SODIUM BICARBONATE, SODIUM CHLORIDE, POTASSIUM CHLORIDE 236; 22.74; 6.74; 5.86; 2.97 G/4L; G/4L; G/4L; G/4L; G/4L
POWDER, FOR SOLUTION ORAL
Qty: 1 BOTTLE | Refills: 0 | Status: SHIPPED | OUTPATIENT
Start: 2017-07-20 | End: 2017-10-12

## 2017-07-20 NOTE — PROGRESS NOTES
Colon Screen    Patient: Isrrael Ngo MRN: [id number]  SSN: [id number]    YOB: 1950  Age: 74 y.o. Sex: female        Subjective:   Isrrael Ngo presents for colon screening. PCP is Kathryn Kehr, MD. Patient denies rectal pain or bleeding. Abdominal surgeries as described below, specifically appendectomy and hysterectomy. Patient has a bowel movement 1 per day. She denies changes in weight or bowel habits. She has occasional sharp abd pains prior to BM. Patient has no known family history of colon cancer. Last colonoscopy was 10 years ago in [place]. She reports there were several polyps on that exam.        Allergies   Allergen Reactions    Omnicef [Cefdinir] Rash       Past Medical History:   Diagnosis Date    Common migraine     Hematuria     Hx of vertigo     Hypercholesterolemia      Past Surgical History:   Procedure Laterality Date    HX APPENDECTOMY      HX HEENT      wisdom teeth and molar extraction    HX HYSTERECTOMY      partial     HX TONSILLECTOMY        Family History   Problem Relation Age of Onset    Arthritis-osteo Mother     Diabetes Mother     Elevated Lipids Mother     Hypertension Mother     Stroke Mother     Alcohol abuse Father     Cancer Maternal Aunt      Social History   Substance Use Topics    Smoking status: Former Smoker     Packs/day: 1.50     Years: 37.00     Types: Cigarettes     Quit date: 1/1/2007    Smokeless tobacco: Never Used    Alcohol use No      Prior to Admission medications    Medication Sig Start Date End Date Taking?  Authorizing Provider   meloxicam (MOBIC) 15 mg tablet TAKE 1 TABLET BY MOUTH DAILY AS NEEDED FOR INFLAMMATION 7/11/17  Yes Kathryn Kehr, MD   albuterol (PROVENTIL HFA, VENTOLIN HFA, PROAIR HFA) 90 mcg/actuation inhaler Take 2 Puffs by inhalation every four (4) hours as needed for Wheezing or Shortness of Breath. 6/23/17  Yes Tripp Cannon MD   alendronate (FOSAMAX) 70 mg tablet Take 1 Tab by mouth every seven (7) days. 4/20/17  Yes Teofilo Chun MD   atorvastatin (LIPITOR) 40 mg tablet TAKE 1 TABLET BY MOUTH EVERY DAY AT BEDTIME 2/20/17  Yes Teofilo Chun MD   MULTIVITAMIN (ONE DAILY VITAMIN PO) Take  by mouth. Historical Provider   Azelastine (ASTEPRO) 0.15 % (205.5 mcg) nasal spray 1 [place] by Both Nostrils route two (2) times a day. Indications: PERENNIAL ALLERGIC RHINITIS 1/24/17   Teofilo Chun MD   ASPIRIN/ACETAMINOPHEN/CAFFEINE [facility] MIGRAINE PO) Take  by mouth. Historical Provider          Review of Systems:  Gastrointestinal: negative      Risks colonoscopy described- colon injury, missed lesion, anesthesia problems, bleeding. Colonoscopy preparation reviewed in detail with patient and a copy of the instructions was provided to the patient.        Solomon Santiago LPN  July 20, 9178  80:49 AM

## 2017-07-20 NOTE — PATIENT INSTRUCTIONS
If you have any questions or concerns about today's appointment, the verbal and/or written instructions you were given for follow up care, please call our office at [phone].     [facility] - [address]    [phone] office  [phone]

## 2017-08-07 ENCOUNTER — TELEPHONE (OUTPATIENT)
Dept: FAMILY MEDICINE CLINIC | Age: 67
End: 2017-08-07

## 2017-08-07 DIAGNOSIS — Z12.31 ENCOUNTER FOR SCREENING MAMMOGRAM FOR HIGH-RISK PATIENT: Primary | ICD-10-CM

## 2017-08-07 NOTE — TELEPHONE ENCOUNTER
Pt received a letter stating she is to requesting an order for a [facility].    Pt is requesting order be faxed to [facility] [phone]    Phone number [phone]

## 2017-09-03 DIAGNOSIS — M79.671 PAIN IN BOTH FEET: ICD-10-CM

## 2017-09-03 DIAGNOSIS — M72.2 PLANTAR FASCIITIS: ICD-10-CM

## 2017-09-03 DIAGNOSIS — M79.672 PAIN IN BOTH FEET: ICD-10-CM

## 2017-09-05 RX ORDER — MELOXICAM 15 MG/1
TABLET ORAL
Qty: 30 TAB | Refills: 1 | Status: SHIPPED | OUTPATIENT
Start: 2017-09-05 | End: 2017-10-10 | Stop reason: SDUPTHER

## 2017-09-08 DIAGNOSIS — Z12.31 ENCOUNTER FOR SCREENING MAMMOGRAM FOR HIGH-RISK PATIENT: ICD-10-CM

## 2017-10-10 DIAGNOSIS — M72.2 PLANTAR FASCIITIS: ICD-10-CM

## 2017-10-10 DIAGNOSIS — M79.672 PAIN IN BOTH FEET: ICD-10-CM

## 2017-10-10 DIAGNOSIS — M79.671 PAIN IN BOTH FEET: ICD-10-CM

## 2017-10-10 RX ORDER — MELOXICAM 7.5 MG/1
7.5 TABLET ORAL DAILY
Qty: 90 TAB | Refills: 0 | Status: SHIPPED | OUTPATIENT
Start: 2017-10-10 | End: 2018-02-05 | Stop reason: SDUPTHER

## 2017-10-12 ENCOUNTER — HOSPITAL ENCOUNTER (OUTPATIENT)
Age: 67
Setting detail: OUTPATIENT SURGERY
Discharge: HOME | End: 2017-10-12
Attending: COLON & RECTAL SURGERY | Admitting: COLON & RECTAL SURGERY
Payer: MEDICARE

## 2017-10-12 VITALS
SYSTOLIC BLOOD PRESSURE: 131 MMHG | BODY MASS INDEX: 30.55 KG/M2 | OXYGEN SATURATION: 95 % | TEMPERATURE: 97.3 F | HEART RATE: 63 BPM | HEIGHT: 62 IN | RESPIRATION RATE: 12 BRPM | WEIGHT: 166 LBS | DIASTOLIC BLOOD PRESSURE: 59 MMHG

## 2017-10-12 PROCEDURE — 74011250636 HC RX REV CODE- 250/636: Performed by: COLON & RECTAL SURGERY

## 2017-10-12 PROCEDURE — 77030031670 HC DEV INFL ENDOTEK BIG60 MRTM -B: Performed by: COLON & RECTAL SURGERY

## 2017-10-12 PROCEDURE — 74011250636 HC RX REV CODE- 250/636

## 2017-10-12 PROCEDURE — G0500 MOD SEDAT ENDO SERVICE >5YRS: HCPCS | Performed by: COLON & RECTAL SURGERY

## 2017-10-12 PROCEDURE — 76040000007: Performed by: COLON & RECTAL SURGERY

## 2017-10-12 RX ORDER — FLUMAZENIL 0.1 MG/ML
0.2 INJECTION INTRAVENOUS
Status: CANCELLED | OUTPATIENT
Start: 2017-10-12 | End: 2017-10-12

## 2017-10-12 RX ORDER — EPINEPHRINE 0.1 MG/ML
1 INJECTION INTRACARDIAC; INTRAVENOUS
Status: CANCELLED | OUTPATIENT
Start: 2017-10-12 | End: 2017-10-12

## 2017-10-12 RX ORDER — NALOXONE HYDROCHLORIDE 0.4 MG/ML
0.4 INJECTION, SOLUTION INTRAMUSCULAR; INTRAVENOUS; SUBCUTANEOUS
Status: CANCELLED | OUTPATIENT
Start: 2017-10-12 | End: 2017-10-12

## 2017-10-12 RX ORDER — MIDAZOLAM HYDROCHLORIDE 1 MG/ML
.25-5 INJECTION, SOLUTION INTRAMUSCULAR; INTRAVENOUS
Status: CANCELLED | OUTPATIENT
Start: 2017-10-12 | End: 2017-10-12

## 2017-10-12 RX ORDER — SODIUM CHLORIDE 9 MG/ML
50 INJECTION, SOLUTION INTRAVENOUS CONTINUOUS
Status: CANCELLED | OUTPATIENT
Start: 2017-10-12 | End: 2017-10-12

## 2017-10-12 RX ORDER — SODIUM CHLORIDE 0.9 % (FLUSH) 0.9 %
5-10 SYRINGE (ML) INJECTION EVERY 8 HOURS
Status: CANCELLED | OUTPATIENT
Start: 2017-10-12 | End: 2017-10-12

## 2017-10-12 RX ORDER — MIDAZOLAM HYDROCHLORIDE 1 MG/ML
INJECTION, SOLUTION INTRAMUSCULAR; INTRAVENOUS
Status: DISCONTINUED
Start: 2017-10-12 | End: 2017-10-12 | Stop reason: HOSPADM

## 2017-10-12 RX ORDER — MIDAZOLAM HYDROCHLORIDE 5 MG/ML
INJECTION INTRAMUSCULAR; INTRAVENOUS AS NEEDED
Status: DISCONTINUED | OUTPATIENT
Start: 2017-10-12 | End: 2017-10-12 | Stop reason: HOSPADM

## 2017-10-12 RX ORDER — ATROPINE SULFATE 0.1 MG/ML
0.5 INJECTION INTRAVENOUS
Status: CANCELLED | OUTPATIENT
Start: 2017-10-12 | End: 2017-10-12

## 2017-10-12 RX ORDER — DEXTROMETHORPHAN/PSEUDOEPHED 2.5-7.5/.8
1.2 DROPS ORAL
Status: CANCELLED | OUTPATIENT
Start: 2017-10-12

## 2017-10-12 RX ORDER — SODIUM CHLORIDE 0.9 % (FLUSH) 0.9 %
5-10 SYRINGE (ML) INJECTION AS NEEDED
Status: CANCELLED | OUTPATIENT
Start: 2017-10-12 | End: 2017-10-12

## 2017-10-12 NOTE — PROCEDURES
Colonoscopy Procedure Note      Carola Mora  1950  [id number]                Date of Procedure: 10/12/2017    Indications:    Screening colonoscopy     Preoperative diagnosis: Z12.11 COLON CANCER SCREENING      Postoperative diagnosis: Colon diverticulosis    Title of Procedure:  Colonoscopy, screening (High risk)    :  Samule Sever, MD    Assistant(s): Endoscopy Technician-1: Gerry Weaver  Endoscopy RN-1: Celio Otero RN    Referring Source:  He Polanco MD    Sedation:  Demerol 75 mg IV,  Versed 5 mg IV      ASA Class: ASA 2 - Mild systemic disease       Procedure Details:    Prior to the procedure, a history and physical were performed. The patients medications, allergies and sensitivities were reviewed and all questions were answered. After informed consent was obtained for the procedure, with all risks and benefits of procedure explained. The patient was taken to the endoscopy suite and placed in the left lateral decubitus position. Patient identification and proposed procedure were verified prior to the procedure by the nurse and I. Following the  satisfactory administration of sedation,  the anus was inspected and appeared within normal limits. Digital rectal examination revealed Normal sphincter tone and squeeze pressure. Palpation revealed No Masses. Next the Olympus video colonoscope was introduced through the anus and advanced to cecum, which was identified by the ileocecal valve and appendiceal orifice, terminal ileum. The quality of preparation was excellent. The terminal ileum was visualized. The colonoscope was then slowly withdrawn and the mucosa carefully examined for any abnormalities. Cecal withdrawl time was greater than six minutes. The patient tolerated the procedure well. Findings:   Rectum: normal  Sigmoid: mild diverticulosis;   Descending Colon: normal  Transverse Colon: normal  Ascending Colon: normal  Cecum: normal  Terminal Ileum: normal    Interventions:  none    Specimen Removed: * No specimens in log *     Complications: None. EBL:  None. Impression:    Colon diverticulosis     Recommendations: -Repeat colonoscopy in 5 years. Resume normal medication(s). Discharge Disposition:  Home in the company of a  when able to ambulate.         Abdifatah Daniel MD, FACS, FASCRS  Colon and Rectal Surgery  [facility]  Office [phone]  Fax     [phone]  10/12/2017  2:48 PM       [facility]  [address] [place] [address]

## 2017-10-12 NOTE — DISCHARGE INSTRUCTIONS
Colonoscopy Discharge Instructions       Philip Kayser  [id number]  1950      COLONOSCOPY FINDINGS:  Your colonoscopy showed: 1. Mild diverticulosis of the sigmoid colon. 2. Otherwise nhormal examination. FOLLOW UP RECOMMENDATIONS:   Dr. Richard Narayanan recommends your next colonoscopy in 5 years. DISCOMFORT:  If you have redness at your IV site- apply warm compress to area; if redness or soreness persist- contact your physician  There may be a slight amount of blood passed from the rectum, more than a teaspoon of bright red blood is not expected - contact your physician  Gaseous discomfort is common- walking, belching will help relieve any gas pains. If discomfort persist- contact your physician    DIET:   High fiber diet. ACTIVITY:  You may resume your normal daily activities, however, it is recommended that you spend the remainder of the day resting - avoiding any strenuous activities. You may not operate a vehicle for 24 hours  You may not engage in an occupation involving machinery or appliances for rest of today  You may not drink alcoholic beverages for at least 24 hours  Avoid making any critical decisions for at least 24 hour    CALL M.D.   ANY SIGN OF:   Increasing pain, nausea, vomiting  Abdominal distension (swelling)  New increased bleeding   Fever or chills  Pain in chest area or shortness of breath      Reina Celaya MD, FACS, FASCRS  Colon and Rectal Surgery  [facility]  Office [phone]  Fax     [phone]

## 2017-10-12 NOTE — IP AVS SNAPSHOT
Ariel Sabillon 
 
 
 [address] 
[phone] Patient: Gino Avina MRN: [id number] VGB:[id number] You are allergic to the following Allergen Reactions Omnicef (Cefdinir) Rash Recent Documentation Height Weight BMI OB Status Smoking Status 1.575 m 75.3 kg 30.36 kg/m2 Hysterectomy Former Smoker Emergency Contacts Name Discharge Info Relation Home Work Mobile [place] DISCHARGE CAREGIVER [3] Spouse [3] [phone] About your hospitalization You were admitted on:  October 12, 2017 You last received care in the:  [facility] PHASE 2 RECOVERY You were discharged on:  October 12, 2017 Unit phone number:  [phone] Why you were hospitalized Your primary diagnosis was:  Not on File Providers Seen During Your Hospitalizations Provider Role Specialty Primary office phone Geovanny Rangel MD Attending Provider Colon and Rectal Surgery [phone] Your Primary Care Physician (PCP) Primary Care Physician Office Phone Office Fax Ann Mary [phone] Follow-up Information Follow up With Details Comments Contact Info MD Liu Zazueta [place] [address] [address] [facility] [phone] Geovanny Rangel MD  Please contact Dr Lis Krishnamurthy for any questions [id number] [facility] [address] [facility] [address] [phone] Current Discharge Medication List  
  
CONTINUE these medications which have NOT CHANGED Dose & Instructions Dispensing Information Comments Morning Noon Evening Bedtime  
 albuterol 90 mcg/actuation inhaler Commonly known as:  PROVENTIL HFA, VENTOLIN HFA, PROAIR HFA Your last dose was: Your next dose is:    
   
   
 Dose:  2 Puff Take 2 Puffs by inhalation every four (4) hours as needed for Wheezing or Shortness of Breath. Quantity:  1 Inhaler Refills:  5 alendronate 70 mg tablet Commonly known as:  FOSAMAX Your last dose was: Your next dose is:    
   
   
 Dose:  70 mg Take 1 Tab by mouth every seven (7) days. Quantity:  12 Tab Refills:  3  
     
   
   
   
  
 atorvastatin 40 mg tablet Commonly known as:  LIPITOR Your last dose was: Your next dose is: TAKE 1 TABLET BY MOUTH EVERY DAY AT BEDTIME Quantity:  90 Tab Refills:  0 Azelastine 0.15 % (205.5 mcg) nasal spray Commonly known as:  ASTEPRO Your last dose was: Your next dose is:    
   
   
 Dose:  1 Spray 1 [place] by Both Nostrils route two (2) times a day. Indications: PERENNIAL ALLERGIC RHINITIS Quantity:  1 Bottle Refills:  3 EXCEDRIN MIGRAINE PO Your last dose was: Your next dose is: Take  by mouth. Refills:  0  
     
   
   
   
  
 meloxicam 7.5 mg tablet Commonly known as:  MOBIC Your last dose was: Your next dose is:    
   
   
 Dose:  7.5 mg Take 1 Tab by mouth daily. Quantity:  90 Tab Refills:  0  
     
   
   
   
  
 ONE DAILY VITAMIN PO Your last dose was: Your next dose is: Take  by mouth. Refills:  0 STOP taking these medications PEG 3350-Electrolytes 236-22.74-6.74 -5.86 gram suspension Commonly known as:  GO-LYTELY Discharge Instructions Colonoscopy Discharge Instructions Tasneem Frankwilmer [id number] 
1950 COLONOSCOPY FINDINGS: 
Your colonoscopy showed: 1. Mild diverticulosis of the sigmoid colon. 2. Otherwise nhormal examination. FOLLOW UP RECOMMENDATIONS: 
 Dr. Dory Washington recommends your next colonoscopy in 5 years. DISCOMFORT: 
If you have redness at your IV site- apply warm compress to area; if redness or soreness persist- contact your physician There may be a slight amount of blood passed from the rectum, more than a teaspoon of bright red blood is not expected - contact your physician Gaseous discomfort is common- walking, belching will help relieve any gas pains. If discomfort persist- contact your physician DIET: 
 High fiber diet. ACTIVITY: 
You may resume your normal daily activities, however, it is recommended that you spend the remainder of the day resting - avoiding any strenuous activities. You may not operate a vehicle for 24 hours You may not engage in an occupation involving machinery or appliances for rest of today You may not drink alcoholic beverages for at least 24 hours Avoid making any critical decisions for at least 24 hour CALL M.D. ANY SIGN OF: Increasing pain, nausea, vomiting Abdominal distension (swelling) New increased bleeding Fever or chills Pain in chest area or shortness of breath Yana May MD, FACS, FASCRS Colon and Rectal Surgery [facility] Office [phone] Fax     [phone] Discharge Orders None Introducing [facility] SERVICES! Dear Yuridia Serve: Thank you for requesting a [employer] account. Our records indicate that you already have an active [employer] account. You can access your account anytime at [email or web address] Did you know that you can access your hospital and ER discharge instructions at any time in [employer]? You can also review all of your test results from your hospital stay or ER visit. Additional Information If you have questions, please visit the Frequently Asked Questions section of the [employer] website at [email or web address] Remember, You[employer]icker is NOT to be used for urgent needs. For medical emergencies, dial 911. Now available from your iPhone and Android! General Information Please provide this summary of care documentation to your next provider. Patient Signature:  ____________________________________________________________ Date:  ____________________________________________________________  
  
[place] [facility] Provider Signature:  ____________________________________________________________ Date:  ____________________________________________________________

## 2017-10-12 NOTE — H&P
[facility]  [address] [address], [address] [place] [address]        Colon and Rectal Surgery History and Physical    Subjective:      López Dillard is a 74 y.o. female who presents for colonoscopy screening. PCP is Cody Benavides MD. Patient denies rectal pain or bleeding. Abdominal surgeries as described below, specifically appendectomy and hysterectomy. Patient has a bowel movement 1 per day. She denies changes in weight or bowel habits. She has occasional sharp abd pains prior to BM. Patient has no known family history of colon cancer.      Last colonoscopy was 10 years ago in [place]. She reports there were several polyps on that exam.            Patient Active Problem List    Diagnosis Date Noted    Screening for depression 05/16/2017    Advanced care planning/counseling discussion 04/10/2017    Microhematuria 06/18/2013    Hyperlipidemia 10/30/2012     Past Medical History:   Diagnosis Date    Arthritis     Common migraine     Hematuria     Hx of vertigo     Hypercholesterolemia       Past Surgical History:   Procedure Laterality Date    HX APPENDECTOMY      HX HEENT      wisdom teeth and molar extraction    HX HYSTERECTOMY      partial     HX TONSILLECTOMY        Social History   Substance Use Topics    Smoking status: Former Smoker     Packs/day: 1.50     Years: 37.00     Types: Cigarettes     Quit date: 1/1/2007    Smokeless tobacco: Never Used    Alcohol use No      Family History   Problem Relation Age of Onset    Arthritis-osteo Mother     Diabetes Mother     Elevated Lipids Mother     Hypertension Mother     Stroke Mother     Alcohol abuse Father     Cancer Maternal Aunt       Prior to Admission medications    Medication Sig Start Date End Date Taking? Authorizing Provider   meloxicam (MOBIC) 7.5 mg tablet Take 1 Tab by mouth daily.  10/10/17  Yes Cody Benavides MD   atorvastatin (LIPITOR) 40 mg tablet TAKE 1 TABLET BY MOUTH EVERY DAY AT BEDTIME 8/16/17  Yes Tonny Shepard PA-C   PEG 3350-Electrolytes (GO-LYTELY) 236-22.74-6.74 -5.86 gram suspension Take as directed for bowel prep. Indications: BOWEL EVACUATION 7/20/17  Yes Kodi Sunshine MD   MULTIVITAMIN (ONE DAILY VITAMIN PO) Take  by mouth. Historical Provider   albuterol (PROVENTIL HFA, VENTOLIN HFA, PROAIR HFA) 90 mcg/actuation inhaler Take 2 Puffs by inhalation every four (4) hours as needed for Wheezing or Shortness of Breath. 6/23/17   Juan Alberto Murillo MD   alendronate (FOSAMAX) 70 mg tablet Take 1 Tab by mouth every seven (7) days. 4/20/17   Kylie Last MD   Azelastine (ASTEPRO) 0.15 % (205.5 mcg) nasal spray 1 [place] by Both Nostrils route two (2) times a day. Indications: PERENNIAL ALLERGIC RHINITIS 1/24/17   Kylie Last MD   ASPIRIN/ACETAMINOPHEN/CAFFEINE [facility] MIGRAINE PO) Take  by mouth. Historical Provider     No current facility-administered medications for this encounter. Allergies   Allergen Reactions    Omnicef [Cefdinir] Rash            Objective:     Visit Vitals    /83    Pulse (!) 59    Temp 97.3 °F (36.3 °C)    Resp 18    Ht 5' 2\" (1.575 m)    Wt 75.3 kg (166 lb)    SpO2 96%    BMI 30.36 kg/m2        Physical Exam:   GENERAL: alert, cooperative, no distress, appears stated age  LUNG: clear to auscultation bilaterally  HEART: regular rate and rhythm  ABDOMEN: soft, non-tender. Bowel sounds normal. No masses,  no organomegaly       Assessment:     Addy Miller is a 74 y.o. female who requires colonoscopy for   Screening colonoscopy. Plan:     1. I recommend proceeding with colonoscopy. The patient was in full agreement and was eager to proceed. 2. I discussed the details of the colonoscopy procedure. The risks of colonoscopy were discussed including colon injury/perforation, anesthesia issues, bleeding, and the possibility of incomplete examination.   The patient was willing to accept these risks and proceed with the examination. All questions were answered to the patient's satisfaction.          James Garza MD, FACS, FASCRS  Colon and Rectal Surgery  [facility]  Office [phone]  Fax     [phone]  10/12/2017  1:58 PM

## 2017-11-13 RX ORDER — ATORVASTATIN CALCIUM 40 MG/1
TABLET, FILM COATED ORAL
Qty: 90 TAB | Refills: 0 | Status: SHIPPED | OUTPATIENT
Start: 2017-11-13 | End: 2018-02-09 | Stop reason: SDUPTHER

## 2018-01-03 ENCOUNTER — OFFICE VISIT (OUTPATIENT)
Dept: FAMILY MEDICINE CLINIC | Age: 68
End: 2018-01-03

## 2018-01-03 ENCOUNTER — HOSPITAL ENCOUNTER (OUTPATIENT)
Dept: LAB | Age: 68
Discharge: HOME | End: 2018-01-03
Payer: MEDICARE

## 2018-01-03 VITALS
HEIGHT: 62 IN | BODY MASS INDEX: 32.02 KG/M2 | SYSTOLIC BLOOD PRESSURE: 167 MMHG | WEIGHT: 174 LBS | DIASTOLIC BLOOD PRESSURE: 79 MMHG | OXYGEN SATURATION: 94 % | TEMPERATURE: 97.8 F | RESPIRATION RATE: 14 BRPM | HEART RATE: 56 BPM

## 2018-01-03 DIAGNOSIS — E78.00 PURE HYPERCHOLESTEROLEMIA: ICD-10-CM

## 2018-01-03 DIAGNOSIS — Z79.899 HIGH RISK MEDICATION USE: ICD-10-CM

## 2018-01-03 DIAGNOSIS — J01.00 ACUTE NON-RECURRENT MAXILLARY SINUSITIS: Primary | ICD-10-CM

## 2018-01-03 LAB
ALBUMIN SERPL-MCNC: 4.1 G/DL (ref 3.4–5)
ALBUMIN/GLOB SERPL: 1.1 {RATIO} (ref 0.8–1.7)
ALP SERPL-CCNC: 81 U/L (ref 45–117)
ALT SERPL-CCNC: 32 U/L (ref 13–56)
ANION GAP SERPL CALC-SCNC: 11 MMOL/L (ref 3–18)
AST SERPL-CCNC: 28 U/L (ref 15–37)
BILIRUB SERPL-MCNC: 0.3 MG/DL (ref 0.2–1)
BUN SERPL-MCNC: 14 MG/DL (ref 7–18)
BUN/CREAT SERPL: 22 (ref 12–20)
CALCIUM SERPL-MCNC: 9.2 MG/DL (ref 8.5–10.1)
CHLORIDE SERPL-SCNC: 104 MMOL/L (ref 100–108)
CHOLEST SERPL-MCNC: 218 MG/DL
CO2 SERPL-SCNC: 26 MMOL/L (ref 21–32)
CREAT SERPL-MCNC: 0.64 MG/DL (ref 0.6–1.3)
GLOBULIN SER CALC-MCNC: 3.6 G/DL (ref 2–4)
GLUCOSE SERPL-MCNC: 76 MG/DL (ref 74–99)
HDLC SERPL-MCNC: 54 MG/DL (ref 40–60)
HDLC SERPL: 4 {RATIO} (ref 0–5)
LDLC SERPL CALC-MCNC: 112.8 MG/DL (ref 0–100)
LIPID PROFILE,FLP: ABNORMAL
POTASSIUM SERPL-SCNC: 3.9 MMOL/L (ref 3.5–5.5)
PROT SERPL-MCNC: 7.7 G/DL (ref 6.4–8.2)
SODIUM SERPL-SCNC: 141 MMOL/L (ref 136–145)
TRIGL SERPL-MCNC: 256 MG/DL (ref ?–150)
VLDLC SERPL CALC-MCNC: 51.2 MG/DL

## 2018-01-03 PROCEDURE — 80061 LIPID PANEL: CPT | Performed by: FAMILY MEDICINE

## 2018-01-03 PROCEDURE — 80053 COMPREHEN METABOLIC PANEL: CPT | Performed by: FAMILY MEDICINE

## 2018-01-03 RX ORDER — AMOXICILLIN AND CLAVULANATE POTASSIUM 875; 125 MG/1; MG/1
1 TABLET, FILM COATED ORAL EVERY 12 HOURS
Qty: 20 TAB | Refills: 0 | Status: SHIPPED | OUTPATIENT
Start: 2018-01-03 | End: 2018-01-13

## 2018-01-03 NOTE — PROGRESS NOTES
Patient presents to the clinic for a follow up on her cholesterol. Patient would also like to discuss sinus pain.

## 2018-01-03 NOTE — MR AVS SNAPSHOT
Visit Information Date & Time Provider Department Dept. Phone Encounter #  
 1/3/2018 11:45 AM Belen Abdi, [address] [facility] [phone] [id number] Follow-up Instructions Return if symptoms worsen or fail to improve. Upcoming Health Maintenance Date Due DTaP/Tdap/Td series (1 - Tdap) 3/13/1973 ZOSTER VACCINE AGE 60> 1/13/2012 Pneumococcal 65+ Low/Medium Risk (1 of 2 - PCV13) 3/13/2017 Influenza Age 5 to Adult 8/1/2017 MEDICARE YEARLY EXAM 4/11/2018 GLAUCOMA SCREENING Q2Y 7/13/2018 BREAST CANCER SCRN MAMMOGRAM 8/24/2019 COLONOSCOPY 10/12/2022 Allergies as of 1/3/2018  Review Complete On: 1/3/2018 By: Belen Abdi MD  
  
 Severity Noted Reaction Type Reactions Omnicef [Cefdinir]  03/15/2016   Systemic Rash Current Immunizations  Never Reviewed No immunizations on file. Not reviewed this visit You Were Diagnosed With   
  
 Codes Comments Acute non-recurrent maxillary sinusitis    -  Primary ICD-10-CM: J01.00 ICD-9-CM: 461.0 Pure hypercholesterolemia     ICD-10-CM: E78.00 ICD-9-CM: 272.0 High risk medication use     ICD-10-CM: Z79.899 ICD-9-CM: V58.69 Vitals BP Pulse Temp Resp Height(growth percentile) Weight(growth percentile) 167/79 (BP 1 Location: Right arm, BP Patient Position: Sitting) (!) 56 97.8 °F (36.6 °C) (Oral) 14 5' 2\" (1.575 m) 174 lb (78.9 kg) SpO2 BMI OB Status Smoking Status 94% 31.83 kg/m2 Hysterectomy Former Smoker BMI and BSA Data Body Mass Index Body Surface Area  
 31.83 kg/m 2 1.86 m 2 Preferred Pharmacy Pharmacy Name Phone [id number] [address] [phone] Your Updated Medication List  
  
   
This list is accurate as of: 1/3/18 12:22 PM.  Always use your most recent med list.  
  
  
  
  
 albuterol 90 mcg/actuation inhaler Commonly known as:  PROVENTIL HFA, VENTOLIN HFA, PROAIR HFA Take 2 Puffs by inhalation every four (4) hours as needed for Wheezing or Shortness of Breath. alendronate 70 mg tablet Commonly known as:  FOSAMAX Take 1 Tab by mouth every seven (7) days. amoxicillin-clavulanate 875-125 mg per tablet Commonly known as:  AUGMENTIN Take 1 Tab by mouth every twelve (12) hours for 10 days. atorvastatin 40 mg tablet Commonly known as:  LIPITOR  
TAKE 1 TABLET BY MOUTH AT BEDTIME  
  
 meloxicam 7.5 mg tablet Commonly known as:  MOBIC Take 1 Tab by mouth daily. ONE DAILY VITAMIN PO Take  by mouth. Prescriptions Sent to Pharmacy Refills  
 amoxicillin-clavulanate (AUGMENTIN) 875-125 mg per tablet 0 Sig: Take 1 Tab by mouth every twelve (12) hours for 10 days. Class: Normal  
 Pharmacy: [address] [place] [address] #: [phone] Route: Oral  
  
Follow-up Instructions Return if symptoms worsen or fail to improve. To-Do List   
 01/03/2018 Lab:  LIPID PANEL   
  
 01/03/2018 Lab:  METABOLIC PANEL, COMPREHENSIVE Patient Instructions I will send you an e-mail with any recommendations about the lab results. Continue same medications. I have prescribed an antibiotic for your sinuses, take it for the full 10 days. Continue sinus flushes. Notify me if the sinuses don't clear up. Introducing [facility]! Dear Geraldine Hester: Thank you for requesting a Vizify account. Our records indicate that you already have an active Vizify account. You can access your account anytime at [email or web address] Did you know that you can access your hospital and ER discharge instructions at any time in Vizify? You can also review all of your test results from your hospital stay or ER visit. Additional Information If you have questions, please visit the Frequently Asked Questions section of the BetterWorks (Closed)t website at [email or web address] Remember, Neema is NOT to be used for urgent needs. For medical emergencies, dial 911. Now available from your iPhone and Android! Please provide this summary of care documentation to your next provider. Your primary care clinician is listed as Bo Barr. If you have any questions after today's visit, please call [phone].

## 2018-01-03 NOTE — PATIENT INSTRUCTIONS
I will send you an e-mail with any recommendations about the lab results. Continue same medications. I have prescribed an antibiotic for your sinuses, take it for the full 10 days. Continue sinus flushes. Notify me if the sinuses don't clear up.

## 2018-01-03 NOTE — PROGRESS NOTES
HISTORY OF PRESENT ILLNESS  Nico Yepez is a 74 y.o. female. HPI Comments: Ms. Ron Batista is here for a checkup, although it's too early for her Medicare Wellness. She has a history of hyperlipidemia and she takes Meloxicam for arthritis. Her last lipids were in 2016. She mentions that her sinuses have been plugged up for over a month now. Her face feels full and she is tired. She has been doing sinus flushes. She denies cough. She also asks about her colonoscopy. She was told that everything was normal and she was due again in 5 years, but her  isn't due for 10 years. Reviewed her op report, mild diverticulosis, she should be good for 10 years. Cholesterol Problem   Pertinent negatives include no chest pain, no abdominal pain, no headaches and no shortness of breath. Sinus Pain    Associated symptoms include congestion. Pertinent negatives include no chills, no cough, no shortness of breath, no headaches and no chest pain. Review of Systems   Constitutional: Negative for chills and fever. HENT: Positive for congestion and sinus pain. Eyes: Negative for blurred vision and double vision. Respiratory: Negative for cough and shortness of breath. Cardiovascular: Negative for chest pain and palpitations. Gastrointestinal: Negative for abdominal pain, nausea and vomiting. Neurological: Negative for headaches. Physical Exam   Constitutional: Vital signs are normal. She appears well-developed and well-nourished. HENT:   Right Ear: Tympanic membrane and ear canal normal.   Left Ear: Tympanic membrane and ear canal normal.   Nose: Nose normal.   Mouth/Throat: Uvula is midline, oropharynx is clear and moist and mucous membranes are normal.   Eyes: Pupils are equal, round, and reactive to light. Neck: No thyromegaly present. Cardiovascular: Normal rate, regular rhythm and normal heart sounds. Pulmonary/Chest: Effort normal and breath sounds normal. No respiratory distress.  She has no wheezes. She has no rales. Abdominal: She exhibits no distension. Lymphadenopathy:     She has no cervical adenopathy. Skin: No rash noted. Nursing note and vitals reviewed. ASSESSMENT and PLAN    ICD-10-CM ICD-9-CM    1. Acute non-recurrent maxillary sinusitis J01.00 461.0 amoxicillin-clavulanate (AUGMENTIN) 875-125 mg per tablet   2. Pure hypercholesterolemia E78.00 272.0 LIPID PANEL   3.  High risk medication use Z83.767 S86.06 METABOLIC PANEL, COMPREHENSIVE       AVS instructions reviewed with patient, pt verbalized understanding

## 2018-02-05 DIAGNOSIS — M72.2 PLANTAR FASCIITIS: ICD-10-CM

## 2018-02-05 DIAGNOSIS — M79.672 PAIN IN BOTH FEET: ICD-10-CM

## 2018-02-05 DIAGNOSIS — M79.671 PAIN IN BOTH FEET: ICD-10-CM

## 2018-02-05 RX ORDER — MELOXICAM 7.5 MG/1
TABLET ORAL
Qty: 90 TAB | Refills: 0 | Status: SHIPPED | OUTPATIENT
Start: 2018-02-05 | End: 2018-05-05 | Stop reason: SDUPTHER

## 2018-02-09 RX ORDER — ATORVASTATIN CALCIUM 40 MG/1
TABLET, FILM COATED ORAL
Qty: 90 TAB | Refills: 0 | Status: SHIPPED | OUTPATIENT
Start: 2018-02-09 | End: 2018-05-28 | Stop reason: SDUPTHER

## 2018-02-12 RX ORDER — PHENOL/SODIUM PHENOLATE
20 AEROSOL, SPRAY (ML) MUCOUS MEMBRANE DAILY
COMMUNITY
End: 2018-02-12 | Stop reason: SDUPTHER

## 2018-02-12 RX ORDER — PANTOPRAZOLE SODIUM 20 MG/1
20 TABLET, DELAYED RELEASE ORAL DAILY
COMMUNITY
End: 2018-02-12

## 2018-02-12 RX ORDER — PHENOL/SODIUM PHENOLATE
20 AEROSOL, SPRAY (ML) MUCOUS MEMBRANE DAILY
Qty: 90 TAB | Refills: 1 | Status: SHIPPED | OUTPATIENT
Start: 2018-02-12 | End: 2018-12-28

## 2018-02-12 NOTE — TELEPHONE ENCOUNTER
Requested Prescriptions     Pending Prescriptions Disp Refills    Omeprazole delayed release (PRILOSEC D/R) 20 mg tablet       Sig: Take 1 Tab by mouth daily.

## 2018-03-19 RX ORDER — ALENDRONATE SODIUM 70 MG/1
TABLET ORAL
Qty: 12 TAB | Refills: 3 | Status: SHIPPED | OUTPATIENT
Start: 2018-03-19 | End: 2019-02-11 | Stop reason: SDUPTHER

## 2018-05-05 DIAGNOSIS — M79.672 PAIN IN BOTH FEET: ICD-10-CM

## 2018-05-05 DIAGNOSIS — M72.2 PLANTAR FASCIITIS: ICD-10-CM

## 2018-05-05 DIAGNOSIS — M79.671 PAIN IN BOTH FEET: ICD-10-CM

## 2018-05-08 RX ORDER — MELOXICAM 7.5 MG/1
TABLET ORAL
Qty: 90 TAB | Refills: 0 | Status: SHIPPED | OUTPATIENT
Start: 2018-05-08 | End: 2018-08-28 | Stop reason: SDUPTHER

## 2018-05-28 RX ORDER — ATORVASTATIN CALCIUM 40 MG/1
TABLET, FILM COATED ORAL
Qty: 90 TAB | Refills: 0 | Status: SHIPPED | COMMUNITY
Start: 2018-05-28 | End: 2018-08-28 | Stop reason: SDUPTHER

## 2018-07-05 ENCOUNTER — OFFICE VISIT (OUTPATIENT)
Dept: FAMILY MEDICINE CLINIC | Age: 68
End: 2018-07-05

## 2018-07-05 VITALS
BODY MASS INDEX: 32.2 KG/M2 | HEART RATE: 58 BPM | OXYGEN SATURATION: 98 % | TEMPERATURE: 98.3 F | RESPIRATION RATE: 18 BRPM | WEIGHT: 175 LBS | DIASTOLIC BLOOD PRESSURE: 83 MMHG | HEIGHT: 62 IN | SYSTOLIC BLOOD PRESSURE: 154 MMHG

## 2018-07-05 DIAGNOSIS — Z13.31 SCREENING FOR DEPRESSION: ICD-10-CM

## 2018-07-05 DIAGNOSIS — Z00.00 INITIAL MEDICARE ANNUAL WELLNESS VISIT: Primary | ICD-10-CM

## 2018-07-05 DIAGNOSIS — Z00.00 ROUTINE GENERAL MEDICAL EXAMINATION AT A HEALTH CARE FACILITY: ICD-10-CM

## 2018-07-05 PROBLEM — Z71.89 ADVANCED CARE PLANNING/COUNSELING DISCUSSION: Status: RESOLVED | Noted: 2017-04-10 | Resolved: 2018-07-05

## 2018-07-05 NOTE — MR AVS SNAPSHOT
[address] [place] [address] [phone] 
[phone] Patient: Castillo Anderson MRN: [id number] PJO:[id number] Visit Information Date & Time Provider Department Dept. Phone Encounter #  
 7/5/2018 11:30 AM Marla Mendosa, [facility] [phone] [id number] Upcoming Health Maintenance Date Due DTaP/Tdap/Td series (1 - Tdap) 3/13/1973 ZOSTER VACCINE AGE 60> 1/13/2012 Pneumococcal 65+ Low/Medium Risk (1 of 2 - PCV13) 3/13/2017 GLAUCOMA SCREENING Q2Y 7/13/2018 Influenza Age 5 to Adult 8/1/2018 MEDICARE YEARLY EXAM 7/6/2019 BREAST CANCER SCRN MAMMOGRAM 8/24/2019 COLONOSCOPY 10/12/2027 Allergies as of 7/5/2018  Review Complete On: 7/5/2018 By: Marla Mendosa MD  
  
 Severity Noted Reaction Type Reactions Omnicef [Cefdinir]  03/15/2016   Systemic Rash Current Immunizations  Reviewed on 7/5/2018 No immunizations on file. Reviewed by Marla Mendosa MD on 7/5/2018 at 11:01 AM  
You Were Diagnosed With   
  
 Codes Comments Initial Medicare annual wellness visit    -  Primary ICD-10-CM: Z00.00 ICD-9-CM: V70.0 Routine general medical examination at a health care facility     ICD-10-CM: Z00.00 ICD-9-CM: V70.0 Screening for depression     ICD-10-CM: Z13.89 ICD-9-CM: V79.0 Vitals BP Pulse Temp Resp Height(growth percentile) Weight(growth percentile) 154/83 (BP 1 Location: Left arm, BP Patient Position: Sitting) (!) 58 98.3 °F (36.8 °C) (Oral) 18 5' 2\" (1.575 m) 175 lb (79.4 kg) SpO2 BMI OB Status Smoking Status 98% 32.01 kg/m2 Hysterectomy Former Smoker Vitals History BMI and BSA Data Body Mass Index Body Surface Area 32.01 kg/m 2 1.86 m 2 Preferred Pharmacy Pharmacy Name Phone [id number] [address] [phone] Your Updated Medication List  
 This list is accurate as of 7/5/18 11:40 AM.  Always use your most recent med list.  
  
  
  
  
 albuterol 90 mcg/actuation inhaler Commonly known as:  PROVENTIL HFA, VENTOLIN HFA, PROAIR HFA Take 2 Puffs by inhalation every four (4) hours as needed for Wheezing or Shortness of Breath. alendronate 70 mg tablet Commonly known as:  FOSAMAX TAKE 1 TABLET BY MOUTH EVERY 7 DAYS  
  
 atorvastatin 40 mg tablet Commonly known as:  LIPITOR  
TAKE 1 TABLET BY MOUTH AT BEDTIME  
  
 meloxicam 7.5 mg tablet Commonly known as:  MOBIC  
TAKE 1 TABLET BY MOUTH EVERY DAY Omeprazole delayed release 20 mg tablet Commonly known as:  PRILOSEC D/R Take 1 Tab by mouth daily. ONE DAILY VITAMIN PO Take  by mouth. Patient Instructions Medicare Wellness Visit, Female The best way to live healthy is to have a lifestyle where you eat a well-balanced diet, exercise regularly, limit alcohol use, and quit all forms of tobacco/nicotine, if applicable. Regular preventive services are another way to keep healthy. Preventive services (vaccines, screening tests, monitoring & exams) can help personalize your care plan, which helps you manage your own care. Screening tests can find health problems at the earliest stages, when they are easiest to treat. [facility] follows the current, evidence-based guidelines published by the [place] [employer] [employer] (USPSTF) when recommending preventive services for our patients. Because we follow these guidelines, sometimes recommendations change over time as research supports it. (For example, mammograms used to be recommended annually. Even though Medicare will still pay for an annual mammogram, the newer guidelines recommend a mammogram every two years for women of average risk.) Of course, you and your provider may decide to screen more often for some diseases, based on your risk and co-morbidities (chronic disease you are already diagnosed with). Preventive services for you include: - Medicare offers their members a free annual wellness visit, which is time for you and your primary care provider to discuss and plan for your preventive service needs. Take advantage of this benefit every year! 
 
-All people over age 72 should receive the recommended pneumonia vaccines. Current USPSTF guidelines recommend a series of two vaccines for the best pneumonia protection.  
 
-All adults should have a yearly flu vaccine and a tetanus vaccine every 10 years. All adults age 61 years should receive a shingles vaccine once in their lifetime.   
 
-A bone mass density test is recommended when a woman turns 65 to screen for osteoporosis. This test is only recommended once as a screening. Some providers will use this same test as a disease monitoring tool if you already have osteoporosis. -All adults age 38-68 years who are overweight should have a diabetes screening test once every three years.  
 
-Other screening tests & preventive services for persons with diabetes include: an eye exam to screen for diabetic retinopathy, a kidney function test, a foot exam, and stricter control over your cholesterol.  
 
-Cardiovascular screening for adults with routine risk involves an electrocardiogram (ECG) at intervals determined by the provider.  
 
-Colorectal cancer screenings should be done for adults age 54-65 years with normal risk. There are a number of acceptable methods of screening for this type of cancer. Each test has its own benefits and drawbacks. Discuss with your provider what is most appropriate for you during your annual wellness visit. The different tests include: colonoscopy (considered the best screening method), a fecal occult blood test, a fecal DNA test, and sigmoidoscopy.  
 
-Breast cancer screenings are recommended every other year for women of normal risk age 54-69 years.  
 
-Cervical cancer screenings for women over age 72 are only recommended with certain risk factors.  
 
-All adults born between [birth year] and 1965 should be screened once for Hepatitis C. Here is a list of your current Health Maintenance items (your personalized list of preventive services) with a due date: 
Health Maintenance Due Topic Date Due  
 DTaP/Tdap/Td  (1 - Tdap) 03/13/1973  Shingles Vaccine  01/13/2012  Pneumococcal Vaccine (1 of 2 - PCV13) 03/13/2017 [facility] Annual Well Visit  04/11/2018  Glaucoma Screening   07/13/2018 You are all caught up except for the 3 vaccines (pneumonia, TdaP and shingles) I highly recommend these. Get your eye exam and have them send us the report. Recheck as needed Introducing [facility]! Dear Diego Araujo: Thank you for requesting a G-CON account. Our records indicate that you already have an active G-CON account. You can access your account anytime at [email or web address] Did you know that you can access your hospital and ER discharge instructions at any time in G-CON? You can also review all of your test results from your hospital stay or ER visit. Additional Information If you have questions, please visit the Frequently Asked Questions section of the G-CON website at [email or web address] Remember, G-CON is NOT to be used for urgent needs. For medical emergencies, dial 911. Now available from your iPhone and Android! Please provide this summary of care documentation to your next provider. Your primary care clinician is listed as Bo Barr. If you have any questions after today's visit, please call [phone].

## 2018-07-05 NOTE — PROGRESS NOTES
Rm:1    Chief Complaint   Patient presents with    Physical     Depression Screening:  PHQ over the last two weeks 7/5/2018 5/16/2017 4/10/2017 1/4/2017 7/19/2016 3/15/2016 6/19/2014   Little interest or pleasure in doing things Not at all Not at all Not at all Not at all Nearly every day Not at all Not at all   Feeling down, depressed or hopeless Not at all Not at all Not at all Not at all Nearly every day Not at all Several days   Total Score PHQ 2 0 0 0 0 6 0 1       Learning Assessment:  Learning Assessment 6/19/2014   PRIMARY LEARNER Patient   HIGHEST LEVEL OF EDUCATION - PRIMARY LEARNER  GRADUATED HIGH SCHOOL OR GED   BARRIERS PRIMARY LEARNER NONE   CO-LEARNER CAREGIVER No   PRIMARY LANGUAGE ENGLISH   LEARNER PREFERENCE PRIMARY LISTENING   ANSWERED BY Patient    RELATIONSHIP SELF       Abuse Screening:  Abuse Screening Questionnaire 5/16/2017   Do you ever feel afraid of your partner? N   Are you in a relationship with someone who physically or mentally threatens you? N   Is it safe for you to go home? Y       Health Maintenance reviewed and discussed per provider: yes     Coordination of Care:    1. Have you been to the ER, urgent care clinic since your last visit? Hospitalized since your last visit? no    2. Have you seen or consulted any other health care providers outside of the [facility] since your last visit? Include any pap smears or colon screening.  no

## 2018-07-05 NOTE — PROGRESS NOTES
This is an Initial Medicare Annual Wellness Exam (AWV) (Performed 12 months after IPPE or effective date of Medicare Part B enrollment, Once in a lifetime)    I have reviewed the patient's medical history in detail and updated the computerized patient record. History     Past Medical History:   Diagnosis Date    Arthritis     Common migraine     Hematuria     Hx of vertigo     Hypercholesterolemia       Past Surgical History:   Procedure Laterality Date    COLONOSCOPY N/A 10/12/2017    COLONOSCOPY performed by Wendi Bishop MD at [address] HX APPENDECTOMY      HX HEENT      wisdom teeth and molar extraction    HX HYSTERECTOMY      partial     HX TONSILLECTOMY       Current Outpatient Prescriptions   Medication Sig Dispense Refill    atorvastatin (LIPITOR) 40 mg tablet TAKE 1 TABLET BY MOUTH AT BEDTIME 90 Tab 0    meloxicam (MOBIC) 7.5 mg tablet TAKE 1 TABLET BY MOUTH EVERY DAY 90 Tab 0    alendronate (FOSAMAX) 70 mg tablet TAKE 1 TABLET BY MOUTH EVERY 7 DAYS 12 Tab 3    Omeprazole delayed release (PRILOSEC D/R) 20 mg tablet Take 1 Tab by mouth daily. 90 Tab 1    MULTIVITAMIN (ONE DAILY VITAMIN PO) Take  by mouth.  albuterol (PROVENTIL HFA, VENTOLIN HFA, PROAIR HFA) 90 mcg/actuation inhaler Take 2 Puffs by inhalation every four (4) hours as needed for Wheezing or Shortness of Breath.  1 Inhaler 5     Allergies   Allergen Reactions    Omnicef [Cefdinir] Rash     Family History   Problem Relation Age of Onset    Arthritis-osteo Mother     Diabetes Mother     Elevated Lipids Mother     Hypertension Mother     Stroke Mother     Alcohol abuse Father     Cancer Maternal Aunt      Social History   Substance Use Topics    Smoking status: Former Smoker     Packs/day: 1.50     Years: 37.00     Types: Cigarettes     Quit date: 1/1/2007    Smokeless tobacco: Never Used    Alcohol use No     Patient Active Problem List   Diagnosis Code    Hyperlipidemia E78.5    Screening for depression Z13.89       Depression Risk Factor Screening:     PHQ over the last two weeks 7/5/2018   Little interest or pleasure in doing things Not at all   Feeling down, depressed or hopeless Not at all   Total Score PHQ 2 0     Alcohol Risk Factor Screening: You do not drink alcohol or very rarely. Functional Ability and Level of Safety:     Hearing Loss  Hearing is good. Activities of Daily Living  The home contains: no safety equipment. Patient does total self care    Fall Risk  Fall Risk Assessment, last 12 mths 7/5/2018   Able to walk? Yes   Fall in past 12 months?  No       Abuse Screen  Patient is not abused    Cognitive Screening   Evaluation of Cognitive Function:  Has your family/caregiver stated any concerns about your memory: no  Normal    Patient Care Team   Patient Care Team:  Vida Jimenez MD as PCP - General (Family Practice)    Assessment/Plan   Education and counseling provided:  pt decllines any vaccines         Health Maintenance Due   Topic Date Due    DTaP/Tdap/Td series (1 - Tdap) 03/13/1973    ZOSTER VACCINE AGE 60>  01/13/2012    Pneumococcal 65+ Low/Medium Risk (1 of 2 - PCV13) 03/13/2017    GLAUCOMA SCREENING Q2Y  07/13/2018

## 2018-07-05 NOTE — PATIENT INSTRUCTIONS
Medicare Wellness Visit, Female    The best way to live healthy is to have a lifestyle where you eat a well-balanced diet, exercise regularly, limit alcohol use, and quit all forms of tobacco/nicotine, if applicable. Regular preventive services are another way to keep healthy. Preventive services (vaccines, screening tests, monitoring & exams) can help personalize your care plan, which helps you manage your own care. Screening tests can find health problems at the earliest stages, when they are easiest to treat. [facility] follows the current, evidence-based guidelines published by the [facility] ([facility]) when recommending preventive services for our patients. Because we follow these guidelines, sometimes recommendations change over time as research supports it. (For example, mammograms used to be recommended annually. Even though Medicare will still pay for an annual mammogram, the newer guidelines recommend a mammogram every two years for women of average risk.)    Of course, you and your provider may decide to screen more often for some diseases, based on your risk and co-morbidities (chronic disease you are already diagnosed with). Preventive services for you include:    - Medicare offers their members a free annual wellness visit, which is time for you and your primary care provider to discuss and plan for your preventive service needs. Take advantage of this benefit every year!    -All people over age 72 should receive the recommended pneumonia vaccines. Current USPSTF guidelines recommend a series of two vaccines for the best pneumonia protection.     -All adults should have a yearly flu vaccine and a tetanus vaccine every 10 years. All adults age 61 years should receive a shingles vaccine once in their lifetime.      -A bone mass density test is recommended when a woman turns 65 to screen for osteoporosis.  This test is only recommended once as a screening. Some providers will use this same test as a disease monitoring tool if you already have osteoporosis. -All adults age 38-68 years who are overweight should have a diabetes screening test once every three years.     -Other screening tests & preventive services for persons with diabetes include: an eye exam to screen for diabetic retinopathy, a kidney function test, a foot exam, and stricter control over your cholesterol.     -Cardiovascular screening for adults with routine risk involves an electrocardiogram (ECG) at intervals determined by the provider.     -Colorectal cancer screenings should be done for adults age 54-65 years with normal risk. There are a number of acceptable methods of screening for this type of cancer. Each test has its own benefits and drawbacks. Discuss with your provider what is most appropriate for you during your annual wellness visit. The different tests include: colonoscopy (considered the best screening method), a fecal occult blood test, a fecal DNA test, and sigmoidoscopy. -Breast cancer screenings are recommended every other year for women of normal risk age 54-69 years.     -Cervical cancer screenings for women over age 72 are only recommended with certain risk factors.     -All adults born between [facility] should be screened once for Hepatitis C. Here is a list of your current Health Maintenance items (your personalized list of preventive services) with a due date:  Health Maintenance Due   Topic Date Due    DTaP/Tdap/Td  (1 - Tdap) 03/13/1973    Shingles Vaccine  01/13/2012    Pneumococcal Vaccine (1 of 2 - PCV13) 03/13/2017    Annual Well Visit  04/11/2018    Glaucoma Screening   07/13/2018       You are all caught up except for the 3 vaccines (pneumonia, TdaP and shingles) I highly recommend these. Get your eye exam and have them send us the report.      Recheck as needed

## 2018-08-02 RX ORDER — OMEPRAZOLE 20 MG/1
CAPSULE, DELAYED RELEASE ORAL
Qty: 90 CAP | Refills: 0 | Status: SHIPPED | OUTPATIENT
Start: 2018-08-02 | End: 2018-12-28

## 2018-08-14 ENCOUNTER — TELEPHONE (OUTPATIENT)
Dept: FAMILY MEDICINE CLINIC | Age: 68
End: 2018-08-14

## 2018-08-14 DIAGNOSIS — Z12.39 BREAST CANCER SCREENING: Primary | ICD-10-CM

## 2018-08-28 DIAGNOSIS — M79.671 PAIN IN BOTH FEET: ICD-10-CM

## 2018-08-28 DIAGNOSIS — M79.672 PAIN IN BOTH FEET: ICD-10-CM

## 2018-08-28 DIAGNOSIS — M72.2 PLANTAR FASCIITIS: ICD-10-CM

## 2018-08-29 RX ORDER — ATORVASTATIN CALCIUM 40 MG/1
TABLET, FILM COATED ORAL
Qty: 90 TAB | Refills: 0 | Status: SHIPPED | OUTPATIENT
Start: 2018-08-29 | End: 2018-11-23 | Stop reason: SDUPTHER

## 2018-08-29 RX ORDER — MELOXICAM 7.5 MG/1
TABLET ORAL
Qty: 90 TAB | Refills: 0 | Status: SHIPPED | OUTPATIENT
Start: 2018-08-29 | End: 2018-11-23 | Stop reason: SDUPTHER

## 2018-10-11 DIAGNOSIS — Z12.39 BREAST CANCER SCREENING: ICD-10-CM

## 2018-11-23 DIAGNOSIS — M79.671 PAIN IN BOTH FEET: ICD-10-CM

## 2018-11-23 DIAGNOSIS — M79.672 PAIN IN BOTH FEET: ICD-10-CM

## 2018-11-23 DIAGNOSIS — M72.2 PLANTAR FASCIITIS: ICD-10-CM

## 2018-11-26 RX ORDER — MELOXICAM 7.5 MG/1
TABLET ORAL
Qty: 90 TAB | Refills: 0 | Status: SHIPPED | OUTPATIENT
Start: 2018-11-26 | End: 2019-02-18 | Stop reason: SDUPTHER

## 2018-11-26 RX ORDER — ATORVASTATIN CALCIUM 40 MG/1
TABLET, FILM COATED ORAL
Qty: 90 TAB | Refills: 0 | Status: SHIPPED | OUTPATIENT
Start: 2018-11-26 | End: 2019-02-18 | Stop reason: SDUPTHER

## 2018-12-28 ENCOUNTER — OFFICE VISIT (OUTPATIENT)
Dept: FAMILY MEDICINE CLINIC | Age: 68
End: 2018-12-28

## 2018-12-28 VITALS
DIASTOLIC BLOOD PRESSURE: 89 MMHG | TEMPERATURE: 98.7 F | SYSTOLIC BLOOD PRESSURE: 143 MMHG | WEIGHT: 173 LBS | HEART RATE: 70 BPM | BODY MASS INDEX: 31.83 KG/M2 | OXYGEN SATURATION: 95 % | HEIGHT: 62 IN | RESPIRATION RATE: 18 BRPM

## 2018-12-28 DIAGNOSIS — R05.9 COUGH: ICD-10-CM

## 2018-12-28 DIAGNOSIS — J06.9 UPPER RESPIRATORY TRACT INFECTION, UNSPECIFIED TYPE: Primary | ICD-10-CM

## 2018-12-28 RX ORDER — AZITHROMYCIN 250 MG/1
TABLET, FILM COATED ORAL
Qty: 6 TAB | Refills: 0 | Status: SHIPPED | OUTPATIENT
Start: 2018-12-28 | End: 2019-01-02

## 2018-12-28 RX ORDER — CODEINE PHOSPHATE AND GUAIFENESIN 10; 100 MG/5ML; MG/5ML
10 SOLUTION ORAL
Qty: 180 ML | Refills: 0 | Status: SHIPPED | OUTPATIENT
Start: 2018-12-28 | End: 2021-12-01

## 2018-12-28 RX ORDER — PREDNISONE 20 MG/1
40 TABLET ORAL DAILY
Qty: 14 TAB | Refills: 0 | Status: SHIPPED | OUTPATIENT
Start: 2018-12-28 | End: 2019-01-04

## 2018-12-28 NOTE — PATIENT INSTRUCTIONS
Take the prednisone for 7 days and use the cough medication as prescribed. This should stop the cough. If the cough doesn't go away, or it becomes more productive, fill the rx for zithromax and take it for 5 days. Recheck as needed

## 2018-12-28 NOTE — PROGRESS NOTES
HISTORY OF PRESENT ILLNESS Buzz Nevarez is a 79 y.o. female. Ms. Tana Bosworth is here because of a cough for 3 weeks or so. Cough is dry, she denies fever, chills, nausea, sore throat. Yesterday she did a lot of work around the house and it worsened it. Review of Systems Constitutional: Negative for chills, fever and malaise/fatigue. HENT: Positive for congestion and sore throat. Negative for ear pain, nosebleeds and tinnitus. Eyes: Negative for blurred vision and discharge. Respiratory: Positive for cough. Negative for hemoptysis, sputum production and wheezing. Cardiovascular: Negative for chest pain and palpitations. Gastrointestinal: Negative for heartburn. Genitourinary: Negative for dysuria and urgency. Neurological: Positive for headaches. Physical Exam  
Constitutional: She is oriented to person, place, and time. Vital signs are normal. She appears well-developed and well-nourished. Non-toxic appearance. No distress. HENT:  
Head: Normocephalic. Eyes: Conjunctivae are normal. Pupils are equal, round, and reactive to light. Neck: Full passive range of motion without pain. Neck supple. No Brudzinski's sign and no Kernig's sign noted. No thyromegaly present. Cardiovascular: Normal rate, regular rhythm and normal heart sounds. No murmur heard. Pulmonary/Chest: Effort normal and breath sounds normal. She has no wheezes. She has no rhonchi. She has no rales. Abdominal: There is no tenderness. There is no rebound and no guarding. Lymphadenopathy:  
  She has no cervical adenopathy. Neurological: She is alert and oriented to person, place, and time. Skin: No rash noted. She is not diaphoretic. Nursing note and vitals reviewed. ASSESSMENT and PLAN 
  ICD-10-CM ICD-9-CM 1. Upper respiratory tract infection, unspecified type J06.9 465.9 predniSONE (DELTASONE) 20 mg tablet 2. Cough R05 786.2 predniSONE (DELTASONE) 20 mg tablet guaiFENesin-codeine (ROBITUSSIN AC) 100-10 mg/5 mL solution

## 2018-12-28 NOTE — PROGRESS NOTES
Rm:1 
 
Chief Complaint Patient presents with  Cough  
  pt presents to office with c/o cough for the past 3 weeks Depression Screening: PHQ over the last two weeks 12/28/2018 7/5/2018 5/16/2017 4/10/2017 1/4/2017 7/19/2016 3/15/2016 Little interest or pleasure in doing things Not at all Not at all Not at all Not at all Not at all Nearly every day Not at all Feeling down, depressed, irritable, or hopeless Not at all Not at all Not at all Not at all Not at all Nearly every day Not at all Total Score PHQ 2 0 0 0 0 0 6 0 Learning Assessment: 
Learning Assessment 6/19/2014 PRIMARY LEARNER Patient HIGHEST LEVEL OF EDUCATION - PRIMARY LEARNER  GRADUATED HIGH SCHOOL OR GED  
BARRIERS PRIMARY LEARNER NONE  
CO-LEARNER CAREGIVER No  
PRIMARY LANGUAGE ENGLISH  
LEARNER PREFERENCE PRIMARY LISTENING  
ANSWERED BY Patient RELATIONSHIP SELF Abuse Screening: 
Abuse Screening Questionnaire 7/5/2018 5/16/2017 Do you ever feel afraid of your partner? Rick Barry Are you in a relationship with someone who physically or mentally threatens you? Rick Barry Is it safe for you to go home? Chip Santana [facility] Maintenance reviewed and discussed per provider: yes Coordination of Care: 1. Have you been to the ER, urgent care clinic since your last visit? Hospitalized since your last visit? no 
 
2. Have you seen or consulted any other health care providers outside of the [address] [place] [address] since your last visit? Include any pap smears or colon screening.  no 
 
 
 <<----- Click to add NO pertinent Past Medical History No pertinent past medical history

## 2019-01-22 ENCOUNTER — IMPORTED ENCOUNTER (OUTPATIENT)
Dept: URBAN - NONMETROPOLITAN AREA CLINIC 1 | Facility: CLINIC | Age: 69
End: 2019-01-22

## 2019-01-22 PROBLEM — H25.13: Noted: 2019-01-22

## 2019-01-22 PROBLEM — H52.03: Noted: 2019-01-22

## 2019-01-22 PROBLEM — H52.4: Noted: 2019-01-22

## 2019-01-22 PROBLEM — H52.223: Noted: 2019-01-22

## 2019-01-22 PROCEDURE — 92014 COMPRE OPH EXAM EST PT 1/>: CPT

## 2019-01-22 NOTE — PATIENT DISCUSSION
Cataract OU-Not yet surgical. -Reviewed symptoms of advancing cataract growth such as glare and halos and decreased vision.-Continue to monitor for now. Pt will notify us if any new symptoms develop. Hyperopia-Discussed diagnosis with patient. Presbyopia-Discussed diagnosis with patient. Updated spec Rx given. Recommend lens that will provide comfort as well as protect safety and health of eyes.; 's Notes: PCP in [place].

## 2019-02-11 RX ORDER — ALENDRONATE SODIUM 70 MG/1
TABLET ORAL
Qty: 12 TAB | Refills: 3 | Status: SHIPPED | OUTPATIENT
Start: 2019-02-11 | End: 2020-01-13 | Stop reason: SDUPTHER

## 2019-02-18 DIAGNOSIS — M79.671 PAIN IN BOTH FEET: ICD-10-CM

## 2019-02-18 DIAGNOSIS — M79.672 PAIN IN BOTH FEET: ICD-10-CM

## 2019-02-18 DIAGNOSIS — M72.2 PLANTAR FASCIITIS: ICD-10-CM

## 2019-02-18 RX ORDER — MELOXICAM 7.5 MG/1
TABLET ORAL
Qty: 90 TAB | Refills: 0 | Status: SHIPPED | OUTPATIENT
Start: 2019-02-18 | End: 2019-05-20 | Stop reason: SDUPTHER

## 2019-02-18 RX ORDER — ATORVASTATIN CALCIUM 40 MG/1
TABLET, FILM COATED ORAL
Qty: 90 TAB | Refills: 0 | Status: SHIPPED | OUTPATIENT
Start: 2019-02-18 | End: 2019-05-20 | Stop reason: SDUPTHER

## 2019-05-20 ENCOUNTER — OFFICE VISIT (OUTPATIENT)
Dept: INTERNAL MEDICINE CLINIC | Age: 69
End: 2019-05-20

## 2019-05-20 ENCOUNTER — HOSPITAL ENCOUNTER (OUTPATIENT)
Dept: LAB | Age: 69
Discharge: HOME | End: 2019-05-20
Payer: MEDICARE

## 2019-05-20 VITALS
BODY MASS INDEX: 32.02 KG/M2 | RESPIRATION RATE: 12 BRPM | DIASTOLIC BLOOD PRESSURE: 68 MMHG | SYSTOLIC BLOOD PRESSURE: 145 MMHG | WEIGHT: 174 LBS | HEIGHT: 62 IN | OXYGEN SATURATION: 100 % | TEMPERATURE: 98.1 F | HEART RATE: 48 BPM

## 2019-05-20 DIAGNOSIS — M79.671 PAIN IN BOTH FEET: ICD-10-CM

## 2019-05-20 DIAGNOSIS — I10 ESSENTIAL HYPERTENSION: ICD-10-CM

## 2019-05-20 DIAGNOSIS — M85.852 OSTEOPENIA OF BOTH HIPS: ICD-10-CM

## 2019-05-20 DIAGNOSIS — R06.09 DOE (DYSPNEA ON EXERTION): ICD-10-CM

## 2019-05-20 DIAGNOSIS — E78.00 PURE HYPERCHOLESTEROLEMIA: ICD-10-CM

## 2019-05-20 DIAGNOSIS — M72.2 PLANTAR FASCIITIS: ICD-10-CM

## 2019-05-20 DIAGNOSIS — M79.672 PAIN IN BOTH FEET: ICD-10-CM

## 2019-05-20 DIAGNOSIS — M85.851 OSTEOPENIA OF BOTH HIPS: ICD-10-CM

## 2019-05-20 DIAGNOSIS — R06.09 DOE (DYSPNEA ON EXERTION): Primary | ICD-10-CM

## 2019-05-20 LAB
ALBUMIN SERPL-MCNC: 4.4 G/DL (ref 3.4–5)
ALBUMIN/GLOB SERPL: 1.3 {RATIO} (ref 0.8–1.7)
ALP SERPL-CCNC: 85 U/L (ref 45–117)
ALT SERPL-CCNC: 35 U/L (ref 13–56)
ANION GAP SERPL CALC-SCNC: 11 MMOL/L (ref 3–18)
AST SERPL-CCNC: 26 U/L (ref 15–37)
BASOPHILS # BLD: 0 K/UL (ref 0–0.1)
BASOPHILS NFR BLD: 0 % (ref 0–2)
BILIRUB SERPL-MCNC: 0.3 MG/DL (ref 0.2–1)
BUN SERPL-MCNC: 14 MG/DL (ref 7–18)
BUN/CREAT SERPL: 18 (ref 12–20)
CALCIUM SERPL-MCNC: 9 MG/DL (ref 8.5–10.1)
CHLORIDE SERPL-SCNC: 104 MMOL/L (ref 100–108)
CHOLEST SERPL-MCNC: 199 MG/DL
CO2 SERPL-SCNC: 26 MMOL/L (ref 21–32)
CREAT SERPL-MCNC: 0.78 MG/DL (ref 0.6–1.3)
DIFFERENTIAL METHOD BLD: ABNORMAL
EOSINOPHIL # BLD: 0.2 K/UL (ref 0–0.4)
EOSINOPHIL NFR BLD: 2 % (ref 0–5)
ERYTHROCYTE [DISTWIDTH] IN BLOOD BY AUTOMATED COUNT: 12.7 % (ref 11.6–14.5)
GLOBULIN SER CALC-MCNC: 3.3 G/DL (ref 2–4)
GLUCOSE SERPL-MCNC: 71 MG/DL (ref 74–99)
HCT VFR BLD AUTO: 45.5 % (ref 35–45)
HDLC SERPL-MCNC: 56 MG/DL (ref 40–60)
HDLC SERPL: 3.6 {RATIO} (ref 0–5)
HGB BLD-MCNC: 14.8 G/DL (ref 12–16)
LDLC SERPL CALC-MCNC: 100.8 MG/DL (ref 0–100)
LIPID PROFILE,FLP: ABNORMAL
LYMPHOCYTES # BLD: 2.6 K/UL (ref 0.9–3.6)
LYMPHOCYTES NFR BLD: 35 % (ref 21–52)
MCH RBC QN AUTO: 30.4 PG (ref 24–34)
MCHC RBC AUTO-ENTMCNC: 32.5 G/DL (ref 31–37)
MCV RBC AUTO: 93.4 FL (ref 74–97)
MONOCYTES # BLD: 0.6 K/UL (ref 0.05–1.2)
MONOCYTES NFR BLD: 8 % (ref 3–10)
NEUTS SEG # BLD: 4.1 K/UL (ref 1.8–8)
NEUTS SEG NFR BLD: 55 % (ref 40–73)
PLATELET # BLD AUTO: 250 K/UL (ref 135–420)
PMV BLD AUTO: 11.8 FL (ref 9.2–11.8)
POTASSIUM SERPL-SCNC: 4.1 MMOL/L (ref 3.5–5.5)
PROT SERPL-MCNC: 7.7 G/DL (ref 6.4–8.2)
RBC # BLD AUTO: 4.87 M/UL (ref 4.2–5.3)
SODIUM SERPL-SCNC: 141 MMOL/L (ref 136–145)
TRIGL SERPL-MCNC: 211 MG/DL (ref ?–150)
VLDLC SERPL CALC-MCNC: 42.2 MG/DL
WBC # BLD AUTO: 7.5 K/UL (ref 4.6–13.2)

## 2019-05-20 PROCEDURE — 80061 LIPID PANEL: CPT

## 2019-05-20 PROCEDURE — 36415 COLL VENOUS BLD VENIPUNCTURE: CPT

## 2019-05-20 PROCEDURE — 82306 VITAMIN D 25 HYDROXY: CPT

## 2019-05-20 PROCEDURE — 80053 COMPREHEN METABOLIC PANEL: CPT

## 2019-05-20 PROCEDURE — 85025 COMPLETE CBC W/AUTO DIFF WBC: CPT

## 2019-05-20 RX ORDER — MELOXICAM 7.5 MG/1
TABLET ORAL
Qty: 90 TAB | Refills: 0 | Status: SHIPPED | OUTPATIENT
Start: 2019-05-20

## 2019-05-20 RX ORDER — ATORVASTATIN CALCIUM 40 MG/1
TABLET, FILM COATED ORAL
Qty: 90 TAB | Refills: 1 | Status: SHIPPED | OUTPATIENT
Start: 2019-05-20 | End: 2019-11-14 | Stop reason: SDUPTHER

## 2019-05-20 NOTE — PROGRESS NOTES
HISTORY OF PRESENT ILLNESS  Kashmir Myles is a 81 y.o. female. HPI  Ms. Omkar Odonnell presents as a new patient to establish care. 1) Hyperlipidemia - taking Lipitor    2) Osteopenia - taking Fosamax x 2 years. Due for repeat DEXA. - Not taking OTC Ca/Vit D. 3) Daily Mobic - for foot, joint, or neck pain. She feels she requires this daily. 4) C/o chronic MACKAY. C/o being \"out of breath\" with climbing 1 flight of stairs, with walking the airport corridor, and with intercourse. She states this limits relations with her . She previously was referred to pulmonology and reports she actually wasn't SOB walking around the specialist's office. She was given an albuterol inhaler but states this does not help her symptoms at all.   - No leg swelling. Sleeps on 1 pillow at night. Denies orthopnea. - Hx of chronic cardiac murmur since childhood. Review of Systems   Respiratory: Positive for shortness of breath (MACKAY). Negative for cough and wheezing. Cardiovascular: Negative for chest pain, orthopnea, leg swelling and PND. Visit Vitals  /68   Pulse (!) 48   Temp 98.1 °F (36.7 °C) (Oral)   Resp 12   Ht 5' 2\" (1.575 m)   Wt 174 lb (78.9 kg)   SpO2 100%   BMI 31.83 kg/m²       Physical Exam   Constitutional: She is oriented to person, place, and time. She appears well-developed and well-nourished. No distress. HENT:   Head: Normocephalic and atraumatic. Right Ear: Tympanic membrane, external ear and ear canal normal.   Left Ear: Tympanic membrane, external ear and ear canal normal.   Nose: Nose normal.   Mouth/Throat: Uvula is midline, oropharynx is clear and moist and mucous membranes are normal. No oropharyngeal exudate, posterior oropharyngeal edema, posterior oropharyngeal erythema or tonsillar abscesses. Eyes: Pupils are equal, round, and reactive to light. Conjunctivae are normal. No scleral icterus. Neck: Neck supple. Cardiovascular: Normal rate and regular rhythm.  Exam reveals no gallop. Murmur heard. Systolic murmur is present with a grade of 3/6. Pulses:       Dorsalis pedis pulses are 2+ on the right side, and 2+ on the left side. Posterior tibial pulses are 2+ on the right side, and 2+ on the left side. No pedal or pretibial edema. Pulmonary/Chest: Effort normal and breath sounds normal. No respiratory distress. She has no decreased breath sounds. She has no wheezes. She has no rhonchi. She has no rales. Lymphadenopathy:        Head (right side): No submandibular and no tonsillar adenopathy present. Head (left side): No submandibular and no tonsillar adenopathy present. She has no cervical adenopathy. Right: No supraclavicular adenopathy present. Left: No supraclavicular adenopathy present. Neurological: She is alert and oriented to person, place, and time. Skin: Skin is warm and dry. Psychiatric: She has a normal mood and affect. Her speech is normal.       ASSESSMENT and PLAN  Diagnoses and all orders for this visit:    1. MACKAY (dyspnea on exertion)  -     REFERRAL TO CARDIOLOGY  -     CBC WITH AUTOMATED DIFF; Future    2. Essential hypertension  -     METABOLIC PANEL, COMPREHENSIVE; Future    3. Pure hypercholesterolemia  -     LIPID PANEL; Future  -     atorvastatin (LIPITOR) 40 mg tablet; TAKE 1 TABLET BY MOUTH AT BEDTIME    4. Osteopenia of both hips  -     VITAMIN D, 25 HYDROXY; Future  -     DEXA BONE DENSITY STUDY AXIAL; Future  - Advised of daily calcium - see patient instructions. Will check Vit D then advise of dosage. 5. Plantar fasciitis  6. Pain in both feet  -     meloxicam (MOBIC) 7.5 mg tablet; TAKE 1 TABLET BY MOUTH EVERY DAY   - Risks of chronic use discussed with patient - GI bleed, kidney damage. Advised of prn use if able. She agrees. Patient Instructions   Calcium - you want to consume 1500 mg calcium daily. Given yourself 300 mg from a normal diet.  Then for each additional serving of dairy (8 oz milk, 1 yogurt, 1 serving cottage cheese), give yourself another 300 mg. 1 serving dairy = 300 mg calcium. Try to get the majority from your diet, but what you can't get, take in supplement form. Follow-up and Dispositions    · Return in about 6 months (around 11/20/2019) for follow-up HTN, lipids.

## 2019-05-20 NOTE — PATIENT INSTRUCTIONS
Calcium - you want to consume 1500 mg calcium daily. Given yourself 300 mg from a normal diet. Then for each additional serving of dairy (8 oz milk, 1 yogurt, 1 serving cottage cheese), give yourself another 300 mg. 1 serving dairy = 300 mg calcium. Try to get the majority from your diet, but what you can't get, take in supplement form. Learning About Low Bone Density  What is low bone density? Low bone density (sometimes called osteopenia) is a decrease in thickness, or density, in bones. That means the bones become thinner and weaker. It is much more common in women than in men. It is an early form of osteoporosis, a condition in which the bones are so thin and weak that they can break easily. It's important to know that low bone density is not a disease. It can happen normally with aging. Having low bone density means that there is a greater risk that you may get osteoporosis. It also means that you are more likely to break a bone than someone who does not have low bone density. But not everyone with low bone density gets osteoporosis or breaks a bone. Low bone density doesn't cause any symptoms. It's usually found with a type of X-ray called a bone density test. Low bone density means that your bone density result (T-score) is between -1.0 and -2.5. What increases your risk for low bone density? Things that increase your risk include:  · Having a family history of osteoporosis. · Being thin. · Being white or . · Getting too little physical activity. · Smoking. · Drinking too much alcohol often. · Using certain medicines such as steroids. How can you prevent osteoporosis? There are things you can do to help prevent osteoporosis. Certain lifestyle changes will help slow the loss of bone density. · Eat food that has plenty of calcium and vitamin D. Yogurt, cheese, milk, and dark green vegetables are high in calcium.  Eggs, fatty fish, cereal, and fortified milk are high in vitamin D.  · Talk to your doctor about taking a calcium supplement that has vitamin D in it. · Get regular exercise. ? Do 30 minutes of weight-bearing exercise on most days of the week. Walking, jogging, stair climbing, and dancing are good choices. ? Do resistance exercises with weights or elastic bands 2 or 3 days a week. · Limit alcohol to 2 drinks a day for men and 1 drink a day for women. Too much alcohol can cause health problems. · Do not smoke. Smoking can make bones thin faster. If you need help quitting, talk to your doctor about stop-smoking programs and medicines. These can increase your chances of quitting for good. Prescription medicines are available for treating low bone density. But these are more often used to treat osteoporosis. Follow-up care is a key part of your treatment and safety. Be sure to make and go to all appointments, and call your doctor if you are having problems. It's also a good idea to know your test results and keep a list of the medicines you take. Where can you learn more? Go to http://mynor-ulises.info/. Enter U194 in the search box to learn more about \"Learning About Low Bone Density. \"  Current as of: March 15, 2018  Content Version: 11.9  © 3554-2000 [employer]. Care instructions adapted under license by [employer] (which disclaims liability or warranty for this information). If you have questions about a medical condition or this instruction, always ask your healthcare professional. [place] [address] any warranty or liability for your use of this information.

## 2019-05-20 NOTE — PROGRESS NOTES
ROOM #     Evan Durham presents today for   Chief Complaint   Patient presents with   [address] preferred language for health care discussion is english/other. Is someone accompanying this pt? YES HER     Is the patient using any DME equipment during OV? NO     Depression Screening:  3 most recent [facility] Screens 5/20/2019 12/28/2018 7/5/2018 5/16/2017 4/10/2017 1/4/2017 7/19/2016   Little interest or pleasure in doing things Not at all Not at all Not at all Not at all Not at all Not at all Nearly every day   Feeling down, depressed, irritable, or hopeless Not at all Not at all Not at all Not at all Not at all Not at all Nearly every day   Total Score PHQ 2 0 0 0 0 0 0 6       Learning Assessment:  Learning Assessment 6/19/2014   PRIMARY LEARNER Patient   HIGHEST LEVEL OF EDUCATION - PRIMARY LEARNER  GRADUATED HIGH SCHOOL OR GED   BARRIERS PRIMARY LEARNER NONE   CO-LEARNER CAREGIVER No   PRIMARY LANGUAGE ENGLISH   LEARNER PREFERENCE PRIMARY LISTENING   ANSWERED BY Patient    RELATIONSHIP SELF       Abuse Screening:  Abuse Screening Questionnaire 7/5/2018 5/16/2017   Do you ever feel afraid of your partner? N N   Are you in a relationship with someone who physically or mentally threatens you? N N   Is it safe for you to go home? Y Y       Fall Risk  Fall Risk Assessment, last 12 mths 5/20/2019 12/28/2018 7/5/2018 5/16/2017 4/10/2017   Able to walk? Yes Yes Yes Yes Yes   Fall in past 12 months? No No No No No       Health Maintenance reviewed and discussed per provider. Yes    Evan Durham is due for   Health Maintenance Due   Topic Date Due    Shingrix Vaccine Age 50> (1 of 2) 03/13/2002    GLAUCOMA SCREENING Q2Y  07/13/2018         Please order/place referral if appropriate. Advance Directive:  1. Do you have an advance directive in place? Patient Reply: Santana Nichols    2. If not, would you like material regarding how to put one in place?  Patient Reply: NO    Coordination of Care:  1. Have you been to the ER, urgent care clinic since your last visit? Hospitalized since your last visit? NO    2. Have you seen or consulted any other health care providers outside of the [address] [place] [address] since your last visit? Include any pap smears or colon screening.  NO

## 2019-05-21 LAB — 25(OH)D3 SERPL-MCNC: 22.9 NG/ML (ref 30–100)

## 2019-06-03 ENCOUNTER — OFFICE VISIT (OUTPATIENT)
Dept: CARDIOLOGY CLINIC | Age: 69
End: 2019-06-03

## 2019-06-03 VITALS
SYSTOLIC BLOOD PRESSURE: 122 MMHG | BODY MASS INDEX: 32.57 KG/M2 | OXYGEN SATURATION: 99 % | HEART RATE: 68 BPM | DIASTOLIC BLOOD PRESSURE: 80 MMHG | WEIGHT: 177 LBS | HEIGHT: 62 IN

## 2019-06-03 DIAGNOSIS — R06.02 SHORTNESS OF BREATH: Primary | ICD-10-CM

## 2019-06-03 DIAGNOSIS — R07.89 CHEST TIGHTNESS OR PRESSURE: ICD-10-CM

## 2019-06-03 RX ORDER — NITROGLYCERIN 0.4 MG/1
0.4 TABLET SUBLINGUAL
Qty: 1 BOTTLE | Refills: 3 | Status: SHIPPED | OUTPATIENT
Start: 2019-06-03 | End: 2022-06-01

## 2019-06-03 RX ORDER — ASPIRIN 81 MG/1
81 TABLET ORAL DAILY
Qty: 90 TAB | Refills: 3 | Status: SHIPPED | OUTPATIENT
Start: 2019-06-03 | End: 2020-01-21

## 2019-06-03 NOTE — PROGRESS NOTES
[facility]    Ms. Estrella Gil is 47-year-old female with history of hyperlipidemia, remote history of tobacco abuse disorder    Patient is here today to establish care with me. She denies any prior history of myocardial infarction or congestive heart failure. She denies any history of diabetes or hypertension. For last 1 to 2 years, she has been having worsening dyspnea on exertion with minimal exertion. Her shortness of breath is getting worse up to the point that she cannot walk more than 800 feet around the house without getting short of breath. She cannot work in her backyard. This is often associated with chest pressure which lasts less than 5 minutes. There is no radiation. Occasionally she has a sweating. She denies nausea or vomiting. She does not have any resting chest pressure. This is slowly getting worse. She is very concerned that she cannot have a physical relationship with the  because of this symptom. She denies PND or lower extremity swelling. On occasion she has some fluttering sensation and skipped beat feeling in the heart she denies any presyncope or syncope  Denies any nausea, vomiting, abdominal pain, fever, chills, sputum production.  No hematuria or other bleeding complaints    Past Medical History:   Diagnosis Date    Arthritis     Common migraine     HLD (hyperlipidemia)     Hx of vertigo     Tobacco abuse, in remission          Past Surgical History:   Procedure Laterality Date    COLONOSCOPY N/A 10/12/2017    COLONOSCOPY performed by Arnol Varghese MD at [address] [place] [address] HX APPENDECTOMY      HX HEENT      wisdom teeth and molar extraction    HX HYSTERECTOMY      partial     HX TONSILLECTOMY         Current Outpatient Medications   Medication Sig    meloxicam (MOBIC) 7.5 mg tablet TAKE 1 TABLET BY MOUTH EVERY DAY    atorvastatin (LIPITOR) 40 mg tablet TAKE 1 TABLET BY MOUTH AT BEDTIME    alendronate (FOSAMAX) 70 mg tablet TAKE 1 TABLET BY MOUTH EVERY 7 DAYS    guaiFENesin-codeine (ROBITUSSIN AC) 100-10 mg/5 mL solution Take 10 mL by mouth three (3) times daily as needed for Cough. Max Daily Amount: 30 mL.  albuterol (PROVENTIL HFA, VENTOLIN HFA, PROAIR HFA) 90 mcg/actuation inhaler Take 2 Puffs by inhalation every four (4) hours as needed for Wheezing or Shortness of Breath. No current facility-administered medications for this visit. Allergies and Sensitivities:  Allergies   Allergen Reactions    Omnicef [Cefdinir] Rash       Family History:  Family History   Problem Relation Age of Onset    Arthritis-osteo Mother     Diabetes Mother     Elevated Lipids Mother     Hypertension Mother     Stroke Mother     Alcohol abuse Father     Cancer Maternal Aunt        Social History:  Social History     Tobacco Use    Smoking status: Former Smoker     Packs/day: 1.50     Years: 37.00     Pack years: 55.50     Types: Cigarettes     Last attempt to quit: 2007     Years since quittin.4    Smokeless tobacco: Never Used   Substance Use Topics    Alcohol use: No    Drug use: No     She  reports that she quit smoking about 12 years ago. Her smoking use included cigarettes. She has a 55.50 pack-year smoking history. She has never used smokeless tobacco.  She  reports that she does not drink alcohol. Review of Systems:  Cardiac symptoms as noted above in HPI. All others negative. Denies fatigue, malaise, skin rash, joint pain, blurring vision, photophobia, neck pain, hemoptysis, chronic cough, nausea, vomiting, hematuria, burning micturition, BRBPR, chronic headaches.     Physical Exam:  BP Readings from Last 3 Encounters:   19 122/80   19 145/68   18 143/89         Pulse Readings from Last 3 Encounters:   19 68   19 (!) 48   18 70          Wt Readings from Last 3 Encounters:   19 177 lb (80.3 kg)   19 174 lb (78.9 kg)   18 173 lb (78.5 kg)       Constitutional: Oriented to person, place, and time. HENT: Head: Normocephalic and atraumatic. Eyes: Conjunctivae and extraocular motions are normal.   Neck: No JVD present. Carotid bruit is not appreciated. Cardiovascular: Regular rhythm. No gallop or rubs appreciated. TEENA on aortic area  Lung: Breath sounds normal. No respiratory distress. No ronchi or rales appreciated  Abdominal: No tenderness. No rebound and no guarding. Musculoskeletal: There is no lower extremity edema. No cynosis  Lymphadenopathy:  No cervical or supraclavicular adenopathy appriciated. Neurological: No gross motor deficit noted. Skin: No visible skin rash noted. No Ear discharge noted  Psychiatric: Normal mood and affect. Good distal pulse      Review of Data  LABS:   Lab Results   Component Value Date/Time    Sodium 141 05/20/2019 11:47 AM    Potassium 4.1 05/20/2019 11:47 AM    Chloride 104 05/20/2019 11:47 AM    CO2 26 05/20/2019 11:47 AM    Glucose 71 (L) 05/20/2019 11:47 AM    BUN 14 05/20/2019 11:47 AM    Creatinine 0.78 05/20/2019 11:47 AM     Lipids Latest Ref Rng & Units 5/20/2019 1/3/2018 7/19/2016 7/6/2015   Chol, Total <200 MG/ 218(H) 176 193   HDL 40 - 60 MG/DL 56 54 54 55   LDL 0 - 100 MG/. 8(H) 112. 8(H) 80.4 108(H)   Trig <150 MG/(H) 256(H) 208(H) 149   Chol/HDL Ratio 0 - 5.0   3.6 4.0 3.3 -   Some recent data might be hidden     Lab Results   Component Value Date/Time    ALT (SGPT) 35 05/20/2019 11:47 AM     No results found for: HBA1C, HGBE8, ZZX4UAXF, VJC9DQQV    EKG  (06/19) sinus rhythm at 68 bpm.  Poor R wave progression.   No pathologic Q waves    ECHO    STRESS TEST (EST, PHARM, NUC, ECHO etc)    CATHETERIZATION    IMPRESSION & PLAN:  Ms. Shanell Conley is 60-year-old female with hyperlipidemia, obesity, remote history of tobacco abuse disorder    Dyspnea and chest pressure:  Patient has been having worsening dyspnea on exertion as well as exertional chest pressure as mentioned in HPI for last 1 to 2-year which is slowly getting worse. Considering her risk factor and age, I recommend that she undergo nuclear stress test for risk stratification. We discussed invasive angiography as well however she would like to go with stress test first.  I have asked her to start taking aspirin 81 mg daily. She is already on statin medication. I will provide her sublingual nitroglycerin for as needed use. She was asked to go to the emergency department if she has any chest pain not resolving with nitroglycerin. We will make further recommendation based on stress test finding    Hyperlipidemia:  Currently she is on atorvastatin 40 mg daily. Last LDL was 100. Continue same. Denies any side effect    Obesity:  Her weight is 177 pounds with BMI of 33. I have asked her to consider lifestyle modification and exercise plan once the cardiac work-up is complete    Tobacco abuse disorder: This is in remission. She quit smoking 2007. She used to smoke 2 pack of cigarettes a day before that    This plan was discussed with patient who is in agreement. Thank you for allowing me to participate in patient care. Please feel free to call me if you have any question or concern. Monika Branham MD  Please note: This document has been produced using voice recognition software. Unrecognized errors in transcription may be present.

## 2019-06-12 ENCOUNTER — HOSPITAL ENCOUNTER (OUTPATIENT)
Dept: NON INVASIVE DIAGNOSTICS | Age: 69
Discharge: HOME | End: 2019-06-12
Attending: INTERNAL MEDICINE
Payer: MEDICARE

## 2019-06-12 DIAGNOSIS — R06.02 SHORTNESS OF BREATH: ICD-10-CM

## 2019-06-12 DIAGNOSIS — R07.89 CHEST TIGHTNESS OR PRESSURE: ICD-10-CM

## 2019-06-12 PROCEDURE — 93226 XTRNL ECG REC<48 HR SCAN A/R: CPT

## 2019-06-25 ENCOUNTER — HOSPITAL ENCOUNTER (OUTPATIENT)
Dept: NUCLEAR MEDICINE | Age: 69
Discharge: HOME | End: 2019-06-25
Attending: INTERNAL MEDICINE
Payer: MEDICARE

## 2019-06-25 ENCOUNTER — HOSPITAL ENCOUNTER (OUTPATIENT)
Dept: NON INVASIVE DIAGNOSTICS | Age: 69
Discharge: HOME | End: 2019-06-25
Attending: INTERNAL MEDICINE
Payer: MEDICARE

## 2019-06-25 VITALS
HEIGHT: 62 IN | BODY MASS INDEX: 32.57 KG/M2 | DIASTOLIC BLOOD PRESSURE: 61 MMHG | SYSTOLIC BLOOD PRESSURE: 157 MMHG | WEIGHT: 177 LBS

## 2019-06-25 VITALS
BODY MASS INDEX: 32.57 KG/M2 | DIASTOLIC BLOOD PRESSURE: 64 MMHG | WEIGHT: 177 LBS | SYSTOLIC BLOOD PRESSURE: 153 MMHG | HEIGHT: 62 IN

## 2019-06-25 DIAGNOSIS — R06.02 SHORTNESS OF BREATH: ICD-10-CM

## 2019-06-25 DIAGNOSIS — R07.89 CHEST TIGHTNESS OR PRESSURE: ICD-10-CM

## 2019-06-25 LAB
STRESS BASELINE HR: 52 BPM
STRESS ESTIMATED WORKLOAD: 1.2 METS
STRESS EXERCISE DUR MIN: NORMAL
STRESS PEAK DIAS BP: 68 MMHG
STRESS PEAK SYS BP: 182 MMHG
STRESS PERCENT HR ACHIEVED: 80 %
STRESS POST PEAK HR: 122 BPM
STRESS RATE PRESSURE PRODUCT: NORMAL BPM*MMHG
STRESS TARGET HR: 153 BPM

## 2019-06-25 PROCEDURE — A9500 TC99M SESTAMIBI: HCPCS

## 2019-06-25 PROCEDURE — 93017 CV STRESS TEST TRACING ONLY: CPT

## 2019-06-25 PROCEDURE — 93306 TTE W/DOPPLER COMPLETE: CPT

## 2019-06-25 PROCEDURE — 74011250636 HC RX REV CODE- 250/636: Performed by: INTERNAL MEDICINE

## 2019-06-25 RX ADMIN — REGADENOSON 0.4 MG: 0.08 INJECTION, SOLUTION INTRAVENOUS at 10:33

## 2019-06-26 ENCOUNTER — TELEPHONE (OUTPATIENT)
Dept: INTERNAL MEDICINE CLINIC | Age: 69
End: 2019-06-26

## 2019-06-26 RX ORDER — ERGOCALCIFEROL 1.25 MG/1
50000 CAPSULE ORAL
Qty: 12 CAP | Refills: 1 | Status: SHIPPED | OUTPATIENT
Start: 2019-06-26 | End: 2019-11-27 | Stop reason: SDUPTHER

## 2019-06-26 NOTE — TELEPHONE ENCOUNTER
Please notify Ms. Estrella Gil that her labs looked pretty good, but her Vitamin D was a little low. I am going to send to her pharmacy a prescription for a higher dosed weekly Vitamin D that I want her to take for 6 months. After that time, she can start taking 2000 units Vit D daily.

## 2019-06-27 PROCEDURE — 93017 CV STRESS TEST TRACING ONLY: CPT

## 2019-07-11 ENCOUNTER — OFFICE VISIT (OUTPATIENT)
Dept: CARDIOLOGY CLINIC | Age: 69
End: 2019-07-11

## 2019-07-11 VITALS
SYSTOLIC BLOOD PRESSURE: 131 MMHG | HEART RATE: 74 BPM | OXYGEN SATURATION: 96 % | BODY MASS INDEX: 32.39 KG/M2 | DIASTOLIC BLOOD PRESSURE: 67 MMHG | HEIGHT: 62 IN | WEIGHT: 176 LBS

## 2019-07-11 DIAGNOSIS — I35.0 AORTIC VALVE STENOSIS, ETIOLOGY OF CARDIAC VALVE DISEASE UNSPECIFIED: ICD-10-CM

## 2019-07-11 DIAGNOSIS — R06.00 DYSPNEA, UNSPECIFIED TYPE: Primary | ICD-10-CM

## 2019-07-11 RX ORDER — METOPROLOL SUCCINATE 25 MG/1
25 TABLET, EXTENDED RELEASE ORAL DAILY
Qty: 90 TAB | Refills: 3 | Status: SHIPPED | OUTPATIENT
Start: 2019-07-11 | End: 2020-06-23

## 2019-07-11 NOTE — PROGRESS NOTES
[facility]    Ms. Ingrid Arreguin is 81 y.o. female with history of hyperlipidemia, remote history of tobacco abuse disorder    Patient is here today for follow-up appointment. She continues to have similar symptoms what she has been having for years since last visit. She continues to have a dyspnea with exertion. Occasional chest tightness. However does not last long enough to take any nitroglycerin. This has been happening for several years. She has seen pulmonary team in the past and was told to have bronchospasm. She does not appear to be on any medication. She denies any change in her symptoms since last visit. She takes her medications regularly  Denies any nausea, vomiting, abdominal pain, fever, chills, sputum production. No hematuria or other bleeding complaints    Past Medical History:   Diagnosis Date    Arthritis     Common migraine     HLD (hyperlipidemia)     Hx of vertigo     Tobacco abuse, in remission          Past Surgical History:   Procedure Laterality Date    COLONOSCOPY N/A 10/12/2017    COLONOSCOPY performed by Vivek Cheney MD at [address] HX APPENDECTOMY      HX HEENT      wisdom teeth and molar extraction    HX HYSTERECTOMY      partial     HX TONSILLECTOMY         Current Outpatient Medications   Medication Sig    ergocalciferol (ERGOCALCIFEROL) 50,000 unit capsule Take 1 Cap by mouth every seven (7) days.  aspirin delayed-release 81 mg tablet Take 1 Tab by mouth daily.  nitroglycerin (NITROSTAT) 0.4 mg SL tablet 1 Tab by SubLINGual route every five (5) minutes as needed for Chest Pain. Up to 3 doses.     meloxicam (MOBIC) 7.5 mg tablet TAKE 1 TABLET BY MOUTH EVERY DAY    atorvastatin (LIPITOR) 40 mg tablet TAKE 1 TABLET BY MOUTH AT BEDTIME    alendronate (FOSAMAX) 70 mg tablet TAKE 1 TABLET BY MOUTH EVERY 7 DAYS    guaiFENesin-codeine (ROBITUSSIN AC) 100-10 mg/5 mL solution Take 10 mL by mouth three (3) times daily as needed for Cough. Max Daily Amount: 30 mL.  albuterol (PROVENTIL HFA, VENTOLIN HFA, PROAIR HFA) 90 mcg/actuation inhaler Take 2 Puffs by inhalation every four (4) hours as needed for Wheezing or Shortness of Breath. No current facility-administered medications for this visit. Allergies and Sensitivities:  Allergies   Allergen Reactions    Omnicef [Cefdinir] Rash       Family History:  Family History   Problem Relation Age of Onset    Arthritis-osteo Mother     Diabetes Mother     Elevated Lipids Mother     Hypertension Mother     Stroke Mother     Alcohol abuse Father     Cancer Maternal Aunt        Social History:  Social History     Tobacco Use    Smoking status: Former Smoker     Packs/day: 1.50     Years: 37.00     Pack years: 55.50     Types: Cigarettes     Last attempt to quit: 2007     Years since quittin.5    Smokeless tobacco: Never Used   Substance Use Topics    Alcohol use: No    Drug use: No     She  reports that she quit smoking about 12 years ago. Her smoking use included cigarettes. She has a 55.50 pack-year smoking history. She has never used smokeless tobacco.  She  reports that she does not drink alcohol. Review of Systems:  Cardiac symptoms as noted above in HPI. All others negative. Denies fatigue, malaise, skin rash, joint pain, blurring vision, photophobia, neck pain, hemoptysis, chronic cough, nausea, vomiting, hematuria, burning micturition, BRBPR, chronic headaches. Physical Exam:  BP Readings from Last 3 Encounters:   19 131/67   19 153/64   19 157/61         Pulse Readings from Last 3 Encounters:   19 74   19 68   19 (!) 48          Wt Readings from Last 3 Encounters:   19 176 lb (79.8 kg)   19 177 lb (80.3 kg)   19 177 lb (80.3 kg)       Constitutional: Oriented to person, place, and time. HENT: Head: Normocephalic and atraumatic.  Eyes: Conjunctivae and extraocular motions are normal.   Neck: No JVD present. Carotid bruit is not appreciated. Cardiovascular: Regular rhythm. No gallop or rubs appreciated. TEENA on aortic area  Lung: Breath sounds normal. No respiratory distress. No ronchi or rales appreciated  Abdominal: No tenderness. No rebound and no guarding. Musculoskeletal: There is no lower extremity edema. No cynosis      Review of Data  LABS:   Lab Results   Component Value Date/Time    Sodium 141 05/20/2019 11:47 AM    Potassium 4.1 05/20/2019 11:47 AM    Chloride 104 05/20/2019 11:47 AM    CO2 26 05/20/2019 11:47 AM    Glucose 71 (L) 05/20/2019 11:47 AM    BUN 14 05/20/2019 11:47 AM    Creatinine 0.78 05/20/2019 11:47 AM     Lipids Latest Ref Rng & Units 5/20/2019 1/3/2018 7/19/2016 7/6/2015   Chol, Total <200 MG/ 218(H) 176 193   HDL 40 - 60 MG/DL 56 54 54 55   LDL 0 - 100 MG/. 8(H) 112. 8(H) 80.4 108(H)   Trig <150 MG/(H) 256(H) 208(H) 149   Chol/HDL Ratio 0 - 5.0   3.6 4.0 3.3 -   Some recent data might be hidden     Lab Results   Component Value Date/Time    ALT (SGPT) 35 05/20/2019 11:47 AM     No results found for: HBA1C, HGBE8, ZJI9JQCT, YOM8VKIB, HOA2CGSK    EKG  (06/19) sinus rhythm at 68 bpm.  Poor R wave progression. No pathologic Q waves    HOLTER (06/19)  Tommas Orleans was in Sinus Rhythm. The average heart rate, excluding ectopy, was 63 BPM with a minimum of 46 BPM at 03:57 D3 and a maximum of 116 BPM at 15:08 D1. Heart beats, including ectopy, totaled [id number] beats. VENTRICULAR ECTOPICS totaled 8 averaging 0.2 per hour with 8 single, 0 paired, 0 trigeminy and 0 R on T.  SUPRAVENTRICULAR ECTOPICS totaled 60 averaging 1.3 per hour ,with 58 single and 2 paired beats. Patient diary was submitted, symptoms noted. No patient triggered events noted. Reported \" Shortness of breath\".  Rhythm was sinus and HR less than 100 bpm    ECHO (06/19)  Left Ventricle Normal cavity size, wall thickness, systolic function (ejection fraction normal) and diastolic function. The calculated ejection fraction is 60%. Visually measured ejection fraction. Wall Scoring The left ventricular wall motion is normal.            Left Atrium Normal cavity size. Right Ventricle Normal cavity size and global systolic function. Right Atrium Normal cavity size. Interatrial Septum Interatrial septum not assessed   Aortic Valve Trileaflet valve structure and no regurgitation. There is leaflet calcification. Aortic valve peak gradient is 29 mmHg. Aortic valve mean gradient is 14 mmHg. Aortic valve area is 1.1 cm2. Aortic valve peak velocity is 2.7 cm/s. There is mild to moderate aortic stenosis. Mitral Valve No stenosis. Mitral valve non-specific thickening. Mild mitral annular calcification. Trace regurgitation. Tricuspid Valve Normal valve structure and no stenosis. Tricuspid regurgitation is inadequate for estimation of right ventricular systolic pressure. STRESS TEST (06/19)  · Baseline ECG: Sinus bradycardia, non-specific ST-T wave abnormalities. · Inconclusive stress test.  · Gated SPECT: Left ventricular function post-stress was normal. Calculated ejection fraction is 69%. · Left ventricular perfusion is probably normal.  · There was no convincing evidence of significant reversible defect to suggest on going major ischemia. Inferior defect is most consistent with diaphragmatic attenuation artifact  · Myocardial perfusion imaging supports a low risk stress test.     IMPRESSION & PLAN:  Ms. Matt Bethea is 21-year-old female with hyperlipidemia, obesity, remote history of tobacco abuse disorder    Dyspnea and chest pressure:  Patient has been having worsening dyspnea on exertion as well as exertional chest pressure as mentioned in HPI for last to 3 years. No change in the symptoms. She had undergone nuclear stress test and echocardiogram.  Nuclear stress test showed no significant reversible defect.   Ejection fraction is normal.  This test are low risk test.  In the past he was diagnosed with early bronchospasm. She does not appear to be on any maintenance therapy. She has not seen pulmonary team since then. I am going to refer her to pulmonary team to make sure she does not need any further work-up and management. I am also going to start her on Toprol-XL 25 mg daily empirically. After this intervention, if she continues to have symptoms, she will call me and we will see her back in the office sooner    Aortic stenosis  Echo in June 2019 showed mild aortic stenosis with mean gradient of 14 mmHg. We recommend to continue clinical observation. Will repeat echo in 6 to 12 months    Hyperlipidemia:  Currently she is on atorvastatin 40 mg daily. Last LDL was 100. Continue same. Denies any side effect    Obesity:  Her weight is 177 pounds with BMI of 33. I have asked her to consider lifestyle modification and exercise plan once the cardiac work-up is complete    Tobacco abuse disorder: This is in remission. She quit smoking 2007. She used to smoke 2 pack of cigarettes a day before that  We will send her to pulmonary clinic      This plan was discussed with patient who is in agreement. Thank you for allowing me to participate in patient care. Please feel free to call me if you have any question or concern. Aleshia Frost MD  Please note: This document has been produced using voice recognition software. Unrecognized errors in transcription may be present.

## 2019-07-11 NOTE — PATIENT INSTRUCTIONS
Start Toprol 25 mg Xl daily     Yael-Marcy will call to schedule your testing within 48 hours.      If you do not hear from her, then please call central scheduling at [phone] or [phone] Sabino Kathleen)    All testing/lab work is completed at [facility] [address]      Referral to Dr Cheli Hoyt ( pulmonary)

## 2019-11-14 DIAGNOSIS — E78.00 PURE HYPERCHOLESTEROLEMIA: ICD-10-CM

## 2019-11-14 RX ORDER — ATORVASTATIN CALCIUM 40 MG/1
TABLET, FILM COATED ORAL
Qty: 90 TAB | Refills: 1 | Status: SHIPPED | OUTPATIENT
Start: 2019-11-14 | End: 2020-05-29

## 2019-11-27 RX ORDER — ERGOCALCIFEROL 1.25 MG/1
CAPSULE ORAL
Qty: 12 CAP | Refills: 1 | Status: SHIPPED | OUTPATIENT
Start: 2019-11-27 | End: 2020-04-24

## 2019-12-05 NOTE — PATIENT DISCUSSION
Recommended artificial tears to use: 1 drop 4x a day in both eyes w/Systane Complete use with consistency due to female, CPU use all day reducing blink rate, systemic meds that dry eyes, etc.

## 2020-01-13 NOTE — TELEPHONE ENCOUNTER
Pharmacy request for refill. Last OV 5/20/19, no future appts scheduled.     Requested Prescriptions     Pending Prescriptions Disp Refills    alendronate (FOSAMAX) 70 mg tablet 12 Tab 3

## 2020-01-15 RX ORDER — ALENDRONATE SODIUM 70 MG/1
TABLET ORAL
Qty: 12 TAB | Refills: 3 | Status: SHIPPED | OUTPATIENT
Start: 2020-01-15

## 2020-01-21 ENCOUNTER — OFFICE VISIT (OUTPATIENT)
Dept: CARDIOLOGY CLINIC | Age: 70
End: 2020-01-21

## 2020-01-21 VITALS
WEIGHT: 178 LBS | DIASTOLIC BLOOD PRESSURE: 73 MMHG | SYSTOLIC BLOOD PRESSURE: 153 MMHG | BODY MASS INDEX: 32.76 KG/M2 | OXYGEN SATURATION: 98 % | HEART RATE: 57 BPM | HEIGHT: 62 IN

## 2020-01-21 DIAGNOSIS — R00.2 PALPITATIONS: ICD-10-CM

## 2020-01-21 DIAGNOSIS — I35.0 AORTIC VALVE STENOSIS, ETIOLOGY OF CARDIAC VALVE DISEASE UNSPECIFIED: Primary | ICD-10-CM

## 2020-01-21 DIAGNOSIS — Q35.9: ICD-10-CM

## 2020-01-21 RX ORDER — BUDESONIDE AND FORMOTEROL FUMARATE DIHYDRATE 160; 4.5 UG/1; UG/1
2 AEROSOL RESPIRATORY (INHALATION) 2 TIMES DAILY
COMMUNITY
End: 2020-07-02

## 2020-01-21 NOTE — PATIENT INSTRUCTIONS
Maxine 
Holter monitorbal order and read back per Radha Pearson MD 
Echo in 5 months. Please call central scheduling at [phone] All testing 
is completed at [address] [place] [address]

## 2020-01-21 NOTE — PROGRESS NOTES
[facility]    Ms. Calderon  is 81 y.o. female with history of hyperlipidemia, remote history of tobacco abuse disorder    Patient is here today for follow-up appointment. Patient has been experiencing some palpitation lasting less than 10 seconds on and off for last few weeks. Usually happens on a daily basis. Not associated with any presyncope or syncope. She has stable dyspnea on exertion. Denies any PND, anginal symptoms. Past Medical History:   Diagnosis Date    Arthritis     Common migraine     HLD (hyperlipidemia)     Hx of vertigo     Tobacco abuse, in remission          Past Surgical History:   Procedure Laterality Date    COLONOSCOPY N/A 10/12/2017    COLONOSCOPY performed by Maddison Vance MD at [address] [place] [address] HX APPENDECTOMY      HX HEENT      wisdom teeth and molar extraction    HX HYSTERECTOMY      partial     HX TONSILLECTOMY         Current Outpatient Medications   Medication Sig    budesonide-formoteroL (SYMBICORT) 160-4.5 mcg/actuation HFAA Take 2 Puffs by inhalation two (2) times a day.  alendronate (FOSAMAX) 70 mg tablet TAKE 1 TABLET BY MOUTH EVERY 7 DAYS. Take with a full glass of water on an empty stomach. Remain upright x 1 hour post-medication.  ergocalciferol (ERGOCALCIFEROL) 50,000 unit capsule TAKE ONE CAPSULE BY MOUTH ONE TIME PER WEEK    atorvastatin (LIPITOR) 40 mg tablet TAKE 1 TABLET BY MOUTH EVERYDAY AT BEDTIME    metoprolol succinate (TOPROL-XL) 25 mg XL tablet Take 1 Tab by mouth daily.  nitroglycerin (NITROSTAT) 0.4 mg SL tablet 1 Tab by SubLINGual route every five (5) minutes as needed for Chest Pain. Up to 3 doses.  meloxicam (MOBIC) 7.5 mg tablet TAKE 1 TABLET BY MOUTH EVERY DAY    guaiFENesin-codeine (ROBITUSSIN AC) 100-10 mg/5 mL solution Take 10 mL by mouth three (3) times daily as needed for Cough. Max Daily Amount: 30 mL.     albuterol (PROVENTIL HFA, VENTOLIN HFA, PROAIR HFA) 90 mcg/actuation inhaler Take 2 Puffs by inhalation every four (4) hours as needed for Wheezing or Shortness of Breath. No current facility-administered medications for this visit. Allergies and Sensitivities:  Allergies   Allergen Reactions    Omnicef [Cefdinir] Rash       Family History:  Family History   Problem Relation Age of Onset    Arthritis-osteo Mother     Diabetes Mother     Elevated Lipids Mother     Hypertension Mother     Stroke Mother     Alcohol abuse Father     Cancer Maternal Aunt        Social History:  Social History     Tobacco Use    Smoking status: Former Smoker     Packs/day: 1.50     Years: 37.00     Pack years: 55.50     Types: Cigarettes     Last attempt to quit: 2007     Years since quittin.0    Smokeless tobacco: Never Used   Substance Use Topics    Alcohol use: No    Drug use: No     She  reports that she quit smoking about 13 years ago. Her smoking use included cigarettes. She has a 55.50 pack-year smoking history. She has never used smokeless tobacco.  She  reports no history of alcohol use. Review of Systems:  Cardiac symptoms as noted above in HPI. All others negative. Denies fatigue, malaise, skin rash, joint pain, blurring vision, photophobia, neck pain, hemoptysis, chronic cough, nausea, vomiting, hematuria, burning micturition, BRBPR, chronic headaches. Physical Exam:  BP Readings from Last 3 Encounters:   20 153/73   19 131/67   19 153/64         Pulse Readings from Last 3 Encounters:   20 (!) 57   19 74   19 68          Wt Readings from Last 3 Encounters:   20 178 lb (80.7 kg)   19 176 lb (79.8 kg)   19 177 lb (80.3 kg)       Constitutional: Oriented to person, place, and time. HENT: Head: Normocephalic and atraumatic. Neck: No JVD present. Cardiovascular: Regular rhythm. No gallop or rubs appreciated.  TEENA on aortic area  Lung: Breath sounds normal. No respiratory distress. No ronchi or rales appreciated  Abdominal: No tenderness. No rebound and no guarding. Musculoskeletal: There is no lower extremity edema. No cynosis      Review of Data  LABS:   Lab Results   Component Value Date/Time    Sodium 141 05/20/2019 11:47 AM    Potassium 4.1 05/20/2019 11:47 AM    Chloride 104 05/20/2019 11:47 AM    CO2 26 05/20/2019 11:47 AM    Glucose 71 (L) 05/20/2019 11:47 AM    BUN 14 05/20/2019 11:47 AM    Creatinine 0.78 05/20/2019 11:47 AM     Lipids Latest Ref Rng & Units 5/20/2019 1/3/2018 7/19/2016   Chol, Total <200 MG/ 218(H) 176   HDL 40 - 60 MG/DL 56 54 54   LDL 0 - 100 MG/. 8(H) 112. 8(H) 80.4   Trig <150 MG/(H) 256(H) 208(H)   Chol/HDL Ratio 0 - 5.0   3.6 4.0 3.3   Some recent data might be hidden     Lab Results   Component Value Date/Time    ALT (SGPT) 35 05/20/2019 11:47 AM     No results found for: HBA1C, HGBE8, GWI2YWAJ, EVE8CHMG, QWZ9XAPF    EKG  (06/19) sinus rhythm at 68 bpm.  Poor R wave progression. No pathologic Q waves    HOLTER (06/19)  Ashlyn Emmanuel was in Sinus Rhythm. The average heart rate, excluding ectopy, was 63 BPM with a minimum of 46 BPM at 03:57 D3 and a maximum of 116 BPM at 15:08 D1. Heart beats, including ectopy, totaled [id number] beats. VENTRICULAR ECTOPICS totaled 8 averaging 0.2 per hour with 8 single, 0 paired, 0 trigeminy and 0 R on T.  SUPRAVENTRICULAR ECTOPICS totaled 60 averaging 1.3 per hour ,with 58 single and 2 paired beats. Patient diary was submitted, symptoms noted. No patient triggered events noted. Reported \" Shortness of breath\". Rhythm was sinus and HR less than 100 bpm    ECHO (06/19)  Left Ventricle Normal cavity size, wall thickness, systolic function (ejection fraction normal) and diastolic function. The calculated ejection fraction is 60%. Visually measured ejection fraction. Wall Scoring The left ventricular wall motion is normal.            Left Atrium Normal cavity size.    Right Ventricle Normal cavity size and global systolic function. Right Atrium Normal cavity size. Interatrial Septum Interatrial septum not assessed   Aortic Valve Trileaflet valve structure and no regurgitation. There is leaflet calcification. Aortic valve peak gradient is 29 mmHg. Aortic valve mean gradient is 14 mmHg. Aortic valve area is 1.1 cm2. Aortic valve peak velocity is 2.7 cm/s. There is mild to moderate aortic stenosis. Mitral Valve No stenosis. Mitral valve non-specific thickening. Mild mitral annular calcification. Trace regurgitation. Tricuspid Valve Normal valve structure and no stenosis. Tricuspid regurgitation is inadequate for estimation of right ventricular systolic pressure. STRESS TEST (06/19)  · Baseline ECG: Sinus bradycardia, non-specific ST-T wave abnormalities. · Inconclusive stress test.  · Gated SPECT: Left ventricular function post-stress was normal. Calculated ejection fraction is 69%. · Left ventricular perfusion is probably normal.  · There was no convincing evidence of significant reversible defect to suggest on going major ischemia. Inferior defect is most consistent with diaphragmatic attenuation artifact  · Myocardial perfusion imaging supports a low risk stress test.     IMPRESSION & PLAN:  Ms. Sharon Apple is 22-year-old female with hyperlipidemia, obesity, remote history of tobacco abuse disorder    Aortic stenosis  Echo in June 2019 showed mild aortic stenosis with mean gradient of 14 mmHg. We recommend to continue clinical observation. Will repeat echo in 6 to 12 months    Hyperlipidemia:  Currently she is on atorvastatin 40 mg daily. Last LDL was 100. Continue same. Denies any side effect    Obesity:  Her weight is 178 pounds with BMI of 33. I have asked her to consider lifestyle modification and exercise plan once the cardiac work-up is complete    Tobacco abuse disorder: This is in remission. She quit smoking 2007.   She used to smoke 2 pack of cigarettes a day before that    Palpitation: We will check Holter monitor. No presyncope or syncope. This plan was discussed with patient who is in agreement. Thank you for allowing me to participate in patient care. Please feel free to call me if you have any question or concern. Shannon Veras MD  Please note: This document has been produced using voice recognition software. Unrecognized errors in transcription may be present.

## 2020-01-28 ENCOUNTER — HOSPITAL ENCOUNTER (OUTPATIENT)
Dept: NON INVASIVE DIAGNOSTICS | Age: 70
Discharge: HOME | End: 2020-01-28
Attending: INTERNAL MEDICINE
Payer: MEDICARE

## 2020-01-28 DIAGNOSIS — R00.2 PALPITATIONS: ICD-10-CM

## 2020-01-28 PROCEDURE — 93226 XTRNL ECG REC<48 HR SCAN A/R: CPT

## 2020-05-28 DIAGNOSIS — E78.00 PURE HYPERCHOLESTEROLEMIA: ICD-10-CM

## 2020-05-29 RX ORDER — ATORVASTATIN CALCIUM 40 MG/1
TABLET, FILM COATED ORAL
Qty: 90 TAB | Refills: 1 | Status: SHIPPED | OUTPATIENT
Start: 2020-05-29

## 2020-06-23 RX ORDER — METOPROLOL SUCCINATE 25 MG/1
TABLET, EXTENDED RELEASE ORAL
Qty: 90 TAB | Refills: 3 | Status: SHIPPED | OUTPATIENT
Start: 2020-06-23 | End: 2021-05-04

## 2020-06-26 ENCOUNTER — HOSPITAL ENCOUNTER (OUTPATIENT)
Dept: NON INVASIVE DIAGNOSTICS | Age: 70
Discharge: HOME | End: 2020-06-26
Attending: INTERNAL MEDICINE
Payer: MEDICARE

## 2020-06-26 VITALS
DIASTOLIC BLOOD PRESSURE: 73 MMHG | WEIGHT: 178 LBS | HEIGHT: 62 IN | SYSTOLIC BLOOD PRESSURE: 153 MMHG | BODY MASS INDEX: 32.76 KG/M2

## 2020-06-26 DIAGNOSIS — I35.0 AORTIC VALVE STENOSIS, ETIOLOGY OF CARDIAC VALVE DISEASE UNSPECIFIED: ICD-10-CM

## 2020-06-26 LAB
ECHO AO ARCH DIAM: 1.96 CM
ECHO AO ASC DIAM: 2.72 CM
ECHO AO ROOT DIAM: 2.67 CM
ECHO AV AREA VTI: 1.13 CM2
ECHO AV AREA/BSA VTI: 0.6 CM2/M2
ECHO AV MEAN GRADIENT: 18.88 MMHG
ECHO AV PEAK GRADIENT: 34.46 MMHG
ECHO AV PEAK VELOCITY: 293.51 CM/S
ECHO AV VTI: 72.67 CM
ECHO IVC PROX: 2.16 CM
ECHO LA AREA 4C: 14.8 CM2
ECHO LA MAJOR AXIS: 3.25 CM
ECHO LA MINOR AXIS: 1.79 CM
ECHO LA VOL 2C: 48.2 ML (ref 22–52)
ECHO LA VOL 4C: 34.13 ML (ref 22–52)
ECHO LA VOLUME INDEX A2C: 26.5 ML/M2 (ref 16–28)
ECHO LA VOLUME INDEX A4C: 18.76 ML/M2 (ref 16–28)
ECHO LV E' LATERAL VELOCITY: 5.85 CM/S
ECHO LV E' SEPTAL VELOCITY: 6.63 CM/S
ECHO LV EDV A2C: 68.97 ML
ECHO LV EDV A4C: 93.67 ML
ECHO LV EDV BP: 81.14 ML (ref 56–104)
ECHO LV EDV INDEX A4C: 51.5 ML/M2
ECHO LV EDV INDEX BP: 44.6 ML/M2
ECHO LV EDV NDEX A2C: 37.9 ML/M2
ECHO LV EJECTION FRACTION A2C: 64 PERCENT
ECHO LV EJECTION FRACTION A4C: 58 PERCENT
ECHO LV EJECTION FRACTION BIPLANE: 61.3 PERCENT (ref 55–100)
ECHO LV ESV A2C: 25.06 ML
ECHO LV ESV A4C: 39.38 ML
ECHO LV ESV BP: 31.43 ML (ref 19–49)
ECHO LV ESV INDEX A2C: 13.8 ML/M2
ECHO LV ESV INDEX A4C: 21.6 ML/M2
ECHO LV ESV INDEX BP: 17.3 ML/M2
ECHO LV INTERNAL DIMENSION DIASTOLIC: 4.51 CM (ref 3.9–5.3)
ECHO LV INTERNAL DIMENSION SYSTOLIC: 2.8 CM
ECHO LV IVSD: 0.75 CM (ref 0.6–0.9)
ECHO LV POSTERIOR WALL DIASTOLIC: 0.8 CM (ref 0.6–0.9)
ECHO LVOT DIAM: 2.04 CM
ECHO LVOT PEAK GRADIENT: 3.4 MMHG
ECHO LVOT SV: 82.5 ML
ECHO LVOT VTI: 25.35 CM
ECHO MV A VELOCITY: 90.2 CM/S
ECHO MV E DECELERATION TIME (DT): 0.17 S
ECHO MV E VELOCITY: 92.22 CM/S
ECHO MV E/A RATIO: 1.02
ECHO MV E/E' LATERAL: 15.76
ECHO MV E/E' RATIO (AVERAGED): 14.84
ECHO MV E/E' SEPTAL: 13.91
ECHO RV TAPSE: 2.3 CM (ref 1.5–2)
ECHO TV MAX VELOCITY: 239.63 CM/S
ECHO TV REGURGITANT MAX VELOCITY: 240 CM/S
ECHO TV REGURGITANT PEAK GRADIENT: 22.97 MMHG
LVOT MG: 2.27 MMHG

## 2020-06-26 PROCEDURE — 93306 TTE W/DOPPLER COMPLETE: CPT

## 2020-07-21 ENCOUNTER — HOSPITAL ENCOUNTER (OUTPATIENT)
Dept: LAB | Age: 70
Discharge: HOME | End: 2020-07-21
Payer: MEDICARE

## 2020-07-21 ENCOUNTER — OFFICE VISIT (OUTPATIENT)
Dept: CARDIOLOGY CLINIC | Age: 70
End: 2020-07-21

## 2020-07-21 VITALS
OXYGEN SATURATION: 98 % | DIASTOLIC BLOOD PRESSURE: 71 MMHG | BODY MASS INDEX: 33 KG/M2 | RESPIRATION RATE: 18 BRPM | SYSTOLIC BLOOD PRESSURE: 151 MMHG | HEART RATE: 61 BPM | WEIGHT: 180.4 LBS

## 2020-07-21 DIAGNOSIS — I35.0 AORTIC VALVE STENOSIS, ETIOLOGY OF CARDIAC VALVE DISEASE UNSPECIFIED: ICD-10-CM

## 2020-07-21 DIAGNOSIS — R00.2 PALPITATIONS: Primary | ICD-10-CM

## 2020-07-21 DIAGNOSIS — R06.00 DYSPNEA, UNSPECIFIED TYPE: ICD-10-CM

## 2020-07-21 DIAGNOSIS — R00.2 PALPITATIONS: ICD-10-CM

## 2020-07-21 LAB
ANION GAP SERPL CALC-SCNC: 5 MMOL/L (ref 3–18)
BUN SERPL-MCNC: 15 MG/DL (ref 7–18)
BUN/CREAT SERPL: 21 (ref 12–20)
CALCIUM SERPL-MCNC: 9.2 MG/DL (ref 8.5–10.1)
CHLORIDE SERPL-SCNC: 108 MMOL/L (ref 100–111)
CO2 SERPL-SCNC: 28 MMOL/L (ref 21–32)
CREAT SERPL-MCNC: 0.7 MG/DL (ref 0.6–1.3)
GLUCOSE SERPL-MCNC: 94 MG/DL (ref 74–99)
POTASSIUM SERPL-SCNC: 4 MMOL/L (ref 3.5–5.5)
SODIUM SERPL-SCNC: 141 MMOL/L (ref 136–145)

## 2020-07-21 PROCEDURE — 36415 COLL VENOUS BLD VENIPUNCTURE: CPT

## 2020-07-21 PROCEDURE — 80048 BASIC METABOLIC PNL TOTAL CA: CPT

## 2020-07-21 RX ORDER — LOSARTAN POTASSIUM 25 MG/1
25 TABLET ORAL DAILY
Qty: 90 TAB | Refills: 3 | Status: SHIPPED | OUTPATIENT
Start: 2020-07-21 | End: 2021-05-04

## 2020-07-21 NOTE — PROGRESS NOTES
[facility]    Ms. Sravanthi Rothman is 78 y.o. female with history of hyperlipidemia, remote history of tobacco abuse disorder    Patient is here today for follow-up appointment. Overall her symptoms are much better and almost resolved since last time. She does not complain of any symptoms that is concerning for angina. She denies any presyncope or syncope. She is taking her medications regularly. She is taking care of her mother and she is somewhat stressed because of her health condition. Past Medical History:   Diagnosis Date    Arthritis     Common migraine     HLD (hyperlipidemia)     Hx of vertigo     Left renal mass     Lung nodule     Renal cyst     Tobacco abuse, in remission          Past Surgical History:   Procedure Laterality Date    COLONOSCOPY N/A 10/12/2017    COLONOSCOPY performed by Anneliese Last MD at [address] [place] [address] HX APPENDECTOMY      HX HEENT      wisdom teeth and molar extraction    HX HYSTERECTOMY      partial     HX TONSILLECTOMY         Current Outpatient Medications   Medication Sig    EPINEPHrine (EPIPEN) 0.3 mg/0.3 mL injection 0.3 mg by IntraMUSCular route as needed.  metoprolol succinate (TOPROL-XL) 25 mg XL tablet TAKE 1 TABLET BY MOUTH EVERY DAY    atorvastatin (LIPITOR) 40 mg tablet TAKE 1 TABLET BY MOUTH EVERYDAY AT BEDTIME    ergocalciferol (ERGOCALCIFEROL) 1,250 mcg (50,000 unit) capsule TAKE 1 CAPSULE BY MOUTH ONCE A WEEK    alendronate (FOSAMAX) 70 mg tablet TAKE 1 TABLET BY MOUTH EVERY 7 DAYS. Take with a full glass of water on an empty stomach. Remain upright x 1 hour post-medication.  nitroglycerin (NITROSTAT) 0.4 mg SL tablet 1 Tab by SubLINGual route every five (5) minutes as needed for Chest Pain. Up to 3 doses.     meloxicam (MOBIC) 7.5 mg tablet TAKE 1 TABLET BY MOUTH EVERY DAY    guaiFENesin-codeine (ROBITUSSIN AC) 100-10 mg/5 mL solution Take 10 mL by mouth three (3) times daily as needed for Cough. Max Daily Amount: 30 mL.  albuterol (PROVENTIL HFA, VENTOLIN HFA, PROAIR HFA) 90 mcg/actuation inhaler Take 2 Puffs by inhalation every four (4) hours as needed for Wheezing or Shortness of Breath. No current facility-administered medications for this visit. Allergies and Sensitivities:  Allergies   Allergen Reactions    Venom-Wasp Anaphylaxis    Omnicef [Cefdinir] Rash       Family History:  Family History   Problem Relation Age of Onset   [facility] Arthritis-osteo Mother     Diabetes Mother     Elevated Lipids Mother     Hypertension Mother     Stroke Mother     Atrial Fibrillation Mother     Other Mother         CHF    Alcohol abuse Father     Cancer Maternal Aunt         Breast       Social History:  Social History     Tobacco Use    Smoking status: Former Smoker     Packs/day: 1.50     Years: 37.00     Pack years: 55.50     Types: Cigarettes     Last attempt to quit: 2007     Years since quittin.5    Smokeless tobacco: Never Used   Substance Use Topics    Alcohol use: No    Drug use: No     She  reports that she quit smoking about 13 years ago. Her smoking use included cigarettes. She has a 55.50 pack-year smoking history. She has never used smokeless tobacco.  She  reports no history of alcohol use. Review of Systems:  Cardiac symptoms as noted above in HPI. All others negative. Denies fatigue, malaise, skin rash, joint pain, blurring vision, photophobia, neck pain, hemoptysis, chronic cough, nausea, vomiting, hematuria, burning micturition, BRBPR, chronic headaches. Physical Exam:  BP Readings from Last 3 Encounters:   20 153/73   20 153/73   19 131/67         Pulse Readings from Last 3 Encounters:   20 (!) 57   19 74   19 68          Wt Readings from Last 3 Encounters:   20 180 lb (81.6 kg)   20 178 lb (80.7 kg)   20 178 lb (80.7 kg)       Constitutional: Oriented to person, place, and time. HENT: Head: Normocephalic and atraumatic. Neck: No JVD present. Cardiovascular: Regular rhythm. No gallop or rubs appreciated. TEENA on aortic area  Lung: Breath sounds normal. No respiratory distress. No ronchi or rales appreciated  Abdominal: No tenderness. No rebound and no guarding. Musculoskeletal: There is no lower extremity edema. No cynosis      Review of Data  LABS:   Lab Results   Component Value Date/Time    Sodium 141 05/20/2019 11:47 AM    Potassium 4.1 05/20/2019 11:47 AM    Chloride 104 05/20/2019 11:47 AM    CO2 26 05/20/2019 11:47 AM    Glucose 71 (L) 05/20/2019 11:47 AM    BUN 14 05/20/2019 11:47 AM    Creatinine 0.78 05/20/2019 11:47 AM     Lipids Latest Ref Rng & Units 5/20/2019 1/3/2018 7/19/2016   Chol, Total <200 MG/ 218(H) 176   HDL 40 - 60 MG/DL 56 54 54   LDL 0 - 100 MG/. 8(H) 112. 8(H) 80.4   Trig <150 MG/(H) 256(H) 208(H)   Chol/HDL Ratio 0 - 5.0   3.6 4.0 3.3   Some recent data might be hidden     Lab Results   Component Value Date/Time    ALT (SGPT) 35 05/20/2019 11:47 AM     No results found for: HBA1C, HGBE8, HMD7HWMC, XCB4GSIQ, LET6YIEO    EKG  (06/19) sinus rhythm at 68 bpm.  Poor R wave progression. No pathologic Q waves    HOLTER (06/20)  To Reyes was in Normal Sinus . The average heart rate, excluding ectopy, was 60 BPM with a minimum of 44 BPM at 04:07 D2 and a maximum of 110 BPM at 14:18 D1. Heart beats, including ectopy, totaled 181886 beats. VENTRICULAR ECTOPICS totaled 8 averaging 0.2 per hour with 8 single, 0 paired, 0 trigeminy and 0 R on T.  SUPRAVENTRICULAR ECTOPICS totaled 491 averaging 10.3 per hour ,with 352 single and 36 paired beats. SUPRAVENTRICULAR TACHYCARDIA occurred 17 times. The fastest run was at 165 BPM and occurred at 13:39 D1 with 3 beats. The longest run was 17 beats at 02:42 D2 at a rate of 117 BPM.  Patient diary was submitted, no symptoms noted. No patient triggered events noted.     ECHO (06/20)  Left Ventricle Normal cavity size, wall thickness, systolic function (ejection fraction normal) and diastolic function. The estimated ejection fraction is 55 - 60%. Visually measured ejection fraction. LV wall motion is noted to be no regional wall motion abnormality. Wall Scoring  The left ventricular wall motion is normal.             Left Atrium  Normal cavity size. Left Atrium volume index is 25.43 mL/m2. Right Ventricle  Normal cavity size and global systolic function. Right Atrium  Normal cavity size. Aortic Valve  Probably trileaflet aortic valve. There is leaflet calcification. Aortic valve peak gradient is 34.5 mmHg. Aortic valve mean gradient is 18.9 mmHg. Aortic valve area is 1.1 cm2. Aortic valve peak velocity is 294 cm/s. There is mild to moderate aortic stenosis. Trace aortic valve regurgitation. Mitral Valve  No stenosis. Mitral valve non-specific thickening. Trace regurgitation. Tricuspid Valve  Normal valve structure and no stenosis. Trace regurgitation. Pulmonic Valve  Normal valve structure, no stenosis and no regurgitation. Aorta  Normal aortic root and ascending aorta. Pulmonary Artery  Pulmonary arterial systolic pressure (PASP) is 31 mmHg. Pulmonary hypertension not suggested by Doppler findings. STRESS TEST (06/19)  · Baseline ECG: Sinus bradycardia, non-specific ST-T wave abnormalities. · Inconclusive stress test.  · Gated SPECT: Left ventricular function post-stress was normal. Calculated ejection fraction is 69%. · Left ventricular perfusion is probably normal.  · There was no convincing evidence of significant reversible defect to suggest on going major ischemia.  Inferior defect is most consistent with diaphragmatic attenuation artifact  · Myocardial perfusion imaging supports a low risk stress test.     IMPRESSION & PLAN:  Ms. Vince Dotson is 82-year-old female with hyperlipidemia, obesity, remote history of tobacco abuse disorder    Aortic stenosis  Echo in June 2019 showed mild aortic stenosis with mean gradient of 14 mmHg. Echo in June 20 with mean aortic valve gradient of 19 mmHg. We recommend to continue clinical observation. Will repeat echo in 12 months    Hyperlipidemia:  Currently she is on atorvastatin 40 mg daily. Last LDL was 100. Continue same. Denies any side effect    Obesity:  Her weight is 180 pounds with BMI of 33. I have asked her to consider lifestyle modification and exercise plan    Tobacco abuse disorder: This is in remission. She quit smoking 2007. She used to smoke 2 pack of cigarettes a day before that    Palpitation:  No presyncope or syncope. Holter monitor without any significant arrhythmia. Echo with normal EF. Continue clinical observation. Overall symptoms are better and likely because of stressful situation at home because of taking care of sick mother. This plan was discussed with patient who is in agreement. Thank you for allowing me to participate in patient care. Please feel free to call me if you have any question or concern. Chano Smith MD  Please note: This document has been produced using voice recognition software. Unrecognized errors in transcription may be present.

## 2020-07-21 NOTE — PATIENT INSTRUCTIONS
Medication  Losartan 25 mg daily   **please allow 24-48 hrs for medication to be escribed to pharmacy**    Labs  BMP  No appointment required for lab work  Hours are Mon-Fri 7:00 am-5:30 pm  All labs are completed at [facility], [facility]       Other  BP cuff -DME

## 2021-02-01 ENCOUNTER — OFFICE VISIT (OUTPATIENT)
Dept: CARDIOLOGY CLINIC | Age: 71
End: 2021-02-01
Payer: MEDICARE

## 2021-02-01 VITALS
HEIGHT: 61 IN | DIASTOLIC BLOOD PRESSURE: 66 MMHG | RESPIRATION RATE: 16 BRPM | TEMPERATURE: 97.7 F | OXYGEN SATURATION: 97 % | HEART RATE: 71 BPM | BODY MASS INDEX: 33.64 KG/M2 | WEIGHT: 178.2 LBS | SYSTOLIC BLOOD PRESSURE: 120 MMHG

## 2021-02-01 DIAGNOSIS — I35.0 AORTIC VALVE STENOSIS, ETIOLOGY OF CARDIAC VALVE DISEASE UNSPECIFIED: Primary | ICD-10-CM

## 2021-02-01 PROCEDURE — G8428 CUR MEDS NOT DOCUMENT: HCPCS | Performed by: INTERNAL MEDICINE

## 2021-02-01 PROCEDURE — G8399 PT W/DXA RESULTS DOCUMENT: HCPCS | Performed by: INTERNAL MEDICINE

## 2021-02-01 PROCEDURE — G8417 CALC BMI ABV UP PARAM F/U: HCPCS | Performed by: INTERNAL MEDICINE

## 2021-02-01 PROCEDURE — G8432 DEP SCR NOT DOC, RNG: HCPCS | Performed by: INTERNAL MEDICINE

## 2021-02-01 PROCEDURE — 1090F PRES/ABSN URINE INCON ASSESS: CPT | Performed by: INTERNAL MEDICINE

## 2021-02-01 PROCEDURE — G8536 NO DOC ELDER MAL SCRN: HCPCS | Performed by: INTERNAL MEDICINE

## 2021-02-01 PROCEDURE — 99213 OFFICE O/P EST LOW 20 MIN: CPT | Performed by: INTERNAL MEDICINE

## 2021-02-01 PROCEDURE — 3017F COLORECTAL CA SCREEN DOC REV: CPT | Performed by: INTERNAL MEDICINE

## 2021-02-01 PROCEDURE — 1101F PT FALLS ASSESS-DOCD LE1/YR: CPT | Performed by: INTERNAL MEDICINE

## 2021-02-01 NOTE — PROGRESS NOTES
Melany Calles presents today for   Chief Complaint   Patient presents with    Follow-up     6 month       Melany Calles preferred language for health care discussion is english/other. Personal Protective Equipment:   Personal Protective Equipment was used including: mask-surgical and hands-gloves. Patient was placed on no precaution(s). Patient was masked. Is someone accompanying this pt? Yes; Mother Mrs. Kennedy    Is the patient using any DME equipment during [address]? No    Depression Screening:  3 most recent PHQ Screens 5/20/2019   Little interest or pleasure in doing things Not at all   Feeling down, depressed, irritable, or hopeless Not at all   Total Score PHQ 2 0       Learning Assessment:  Learning Assessment 6/19/2014   PRIMARY LEARNER Patient   HIGHEST LEVEL OF EDUCATION - PRIMARY LEARNER  GRADUATED HIGH SCHOOL OR GED   BARRIERS PRIMARY LEARNER NONE   CO-LEARNER CAREGIVER No   PRIMARY LANGUAGE ENGLISH   LEARNER PREFERENCE PRIMARY LISTENING   ANSWERED BY Patient    RELATIONSHIP SELF       Abuse Screening:  Abuse Screening Questionnaire 7/5/2018   Do you ever feel afraid of your partner? N   Are you in a relationship with someone who physically or mentally threatens you? N   Is it safe for you to go home? Y       Fall Risk  Fall Risk Assessment, last 12 mths 5/20/2019   Able to walk? Yes   Fall in past 12 months? No       Pt currently taking Anticoagulant therapy? No    Coordination of Care:  1. Have you been to the ER, urgent care clinic since your last visit? Hospitalized since your last visit? Yes; [facility] ER 1/30/2021 - Neck Pain     2. Have you seen or consulted any other health care providers outside of the [address] [place] [address] [facility] since your last visit? Include any pap smears or colon screening.  No

## 2021-02-01 NOTE — PATIENT INSTRUCTIONS
Testing Echo( to be completed carissa 9 months) Please call Reyna scheduling at [phone]  to schedule an appointment. All testing is completed at [address] [facility], [facility] **call office 3-5 days after testing is completed for results**

## 2021-02-01 NOTE — PROGRESS NOTES
[facility]    Ms. Sukhwinder Ghosh is 78 y.o. female with history of hyperlipidemia, remote history of tobacco abuse disorder    Patient is here today for follow-up appointment. Patient recently went to the emergency department with some neck pain from her degenerative joint disease. At the time her blood pressure was significantly elevated because of the pain. Since then her neck pain is gone and she is feeling back to normal.  She does not report any symptom that is concerning for angina or heart failure. She denies any palpitation, presyncope or syncope. She denies PND or any lower extremity swelling    Past Medical History:   Diagnosis Date    Arthritis     Common migraine     HLD (hyperlipidemia)     Hx of vertigo     Left renal mass     Lung nodule     Renal cyst     Tobacco abuse, in remission          Past Surgical History:   Procedure Laterality Date    COLONOSCOPY N/A 10/12/2017    COLONOSCOPY performed by Gumaro Vegas MD at [address] HX APPENDECTOMY      HX HEENT      wisdom teeth and molar extraction    HX HYSTERECTOMY      partial     HX TONSILLECTOMY         Current Outpatient Medications   Medication Sig    meclizine (ANTIVERT) 25 mg tablet     ondansetron (ZOFRAN ODT) 4 mg disintegrating tablet     fluticasone propion-salmeteroL 113-14 mcg/actuation aepb INHALE 1 PUFF BY MOUTH TWICE DAILY. RINSE MOUTH AFTER USE    losartan (COZAAR) 25 mg tablet Take 1 Tab by mouth daily.  EPINEPHrine (EPIPEN) 0.3 mg/0.3 mL injection 0.3 mg by IntraMUSCular route as needed.  metoprolol succinate (TOPROL-XL) 25 mg XL tablet TAKE 1 TABLET BY MOUTH EVERY DAY    atorvastatin (LIPITOR) 40 mg tablet TAKE 1 TABLET BY MOUTH EVERYDAY AT BEDTIME    ergocalciferol (ERGOCALCIFEROL) 1,250 mcg (50,000 unit) capsule TAKE 1 CAPSULE BY MOUTH ONCE A WEEK    alendronate (FOSAMAX) 70 mg tablet TAKE 1 TABLET BY MOUTH EVERY 7 DAYS.  Take with a full glass of water on an empty stomach. Remain upright x 1 hour post-medication.  nitroglycerin (NITROSTAT) 0.4 mg SL tablet 1 Tab by SubLINGual route every five (5) minutes as needed for Chest Pain. Up to 3 doses.  meloxicam (MOBIC) 7.5 mg tablet TAKE 1 TABLET BY MOUTH EVERY DAY    guaiFENesin-codeine (ROBITUSSIN AC) 100-10 mg/5 mL solution Take 10 mL by mouth three (3) times daily as needed for Cough. Max Daily Amount: 30 mL.  albuterol (PROVENTIL HFA, VENTOLIN HFA, PROAIR HFA) 90 mcg/actuation inhaler Take 2 Puffs by inhalation every four (4) hours as needed for Wheezing or Shortness of Breath. No current facility-administered medications for this visit. Allergies and Sensitivities:  Allergies   Allergen Reactions    Venom-Wasp Anaphylaxis    Omnicef [Cefdinir] Rash       Family History:  Family History   Problem Relation Age of Onset   24 [facility] Arthritis-osteo Mother     Diabetes Mother     Elevated Lipids Mother     Hypertension Mother     Stroke Mother     Atrial Fibrillation Mother     Other Mother         CHF    Alcohol abuse Father     Cancer Maternal Aunt         Breast       Social History:  Social History     Tobacco Use    Smoking status: Former Smoker     Packs/day: 1.50     Years: 37.00     Pack years: 55.50     Types: Cigarettes     Quit date: 2007     Years since quittin.0    Smokeless tobacco: Never Used   Substance Use Topics    Alcohol use: No    Drug use: No     She  reports that she quit smoking about 14 years ago. Her smoking use included cigarettes. She has a 55.50 pack-year smoking history. She has never used smokeless tobacco.  She  reports no history of alcohol use. Review of Systems:  Cardiac symptoms as noted above in HPI. All others negative.     Physical Exam:  BP Readings from Last 3 Encounters:   21 120/66   20 135/69   20 151/71         Pulse Readings from Last 3 Encounters:   21 71   20 (!) 53   20 61          Wt Readings from Last 3 Encounters:   02/01/21 178 lb 3.2 oz (80.8 kg)   12/09/20 178 lb (80.7 kg)   09/08/20 178 lb 2 oz (80.8 kg)       Constitutional: Oriented to person, place, and time. HENT: Head: Normocephalic and atraumatic. Neck: No JVD present. Cardiovascular: Regular rhythm. No gallop or rubs appreciated. TEENA on aortic area  Lung: Breath sounds normal. No respiratory distress. No ronchi or rales appreciated  Abdominal: No tenderness. No rebound and no guarding. Musculoskeletal: There is no lower extremity edema. No cynosis      Review of Data  LABS:   Lab Results   Component Value Date/Time    Sodium 141 07/21/2020 01:52 PM    Potassium 4.0 07/21/2020 01:52 PM    Chloride 108 07/21/2020 01:52 PM    CO2 28 07/21/2020 01:52 PM    Glucose 94 07/21/2020 01:52 PM    BUN 15 07/21/2020 01:52 PM    Creatinine 0.70 07/21/2020 01:52 PM     Lipids Latest Ref Rng & Units 5/20/2019 1/3/2018   Chol, Total <200 MG/ 218(H)   HDL 40 - 60 MG/DL 56 54   LDL 0 - 100 MG/. 8(H) 112. 8(H)   Trig <150 MG/(H) 256(H)   Chol/HDL Ratio 0 - 5.0   3.6 4.0   Some recent data might be hidden     Lab Results   Component Value Date/Time    ALT (SGPT) 35 05/20/2019 11:47 AM     No results found for: HBA1C, HGBE8, JMX6OOMK, AFA8RJCQ, YCJ8TNMM    EKG  (06/19) sinus rhythm at 68 bpm.  Poor R wave progression. No pathologic Q waves    HOLTER (06/20)  Martina Cartwright was in Normal Sinus . The average heart rate, excluding ectopy, was 60 BPM with a minimum of 44 BPM at 04:07 D2 and a maximum of 110 BPM at 14:18 D1. Heart beats, including ectopy, totaled [id number] beats. VENTRICULAR ECTOPICS totaled 8 averaging 0.2 per hour with 8 single, 0 paired, 0 trigeminy and 0 R on T.  SUPRAVENTRICULAR ECTOPICS totaled 491 averaging 10.3 per hour ,with 352 single and 36 paired beats. SUPRAVENTRICULAR TACHYCARDIA occurred 17 times. The fastest run was at 165 BPM and occurred at 13:39 D1 with 3 beats.   The longest run was 17 beats at 02:42 D2 at a rate of 117 BPM.  Patient diary was submitted, no symptoms noted. No patient triggered events noted. ECHO (06/20)  Left Ventricle  Normal cavity size, wall thickness, systolic function (ejection fraction normal) and diastolic function. The estimated ejection fraction is 55 - 60%. Visually measured ejection fraction. LV wall motion is noted to be no regional wall motion abnormality. Wall Scoring  The left ventricular wall motion is normal.             Left Atrium  Normal cavity size. Left Atrium volume index is 25.43 mL/m2. Right Ventricle  Normal cavity size and global systolic function. Right Atrium  Normal cavity size. Aortic Valve  Probably trileaflet aortic valve. There is leaflet calcification. Aortic valve peak gradient is 34.5 mmHg. Aortic valve mean gradient is 18.9 mmHg. Aortic valve area is 1.1 cm2. Aortic valve peak velocity is 294 cm/s. There is mild to moderate aortic stenosis. Trace aortic valve regurgitation. Mitral Valve  No stenosis. Mitral valve non-specific thickening. Trace regurgitation. Tricuspid Valve  Normal valve structure and no stenosis. Trace regurgitation. Pulmonic Valve  Normal valve structure, no stenosis and no regurgitation. Aorta  Normal aortic root and ascending aorta. Pulmonary Artery  Pulmonary arterial systolic pressure (PASP) is 31 mmHg. Pulmonary hypertension not suggested by Doppler findings. STRESS TEST (06/19)  · Baseline ECG: Sinus bradycardia, non-specific ST-T wave abnormalities. · Inconclusive stress test.  · Gated SPECT: Left ventricular function post-stress was normal. Calculated ejection fraction is 69%. · Left ventricular perfusion is probably normal.  · There was no convincing evidence of significant reversible defect to suggest on going major ischemia.  Inferior defect is most consistent with diaphragmatic attenuation artifact  · Myocardial perfusion imaging supports a low risk stress test. IMPRESSION & PLAN:  Ms. Price Ansari is 78 y.o. female with hyperlipidemia, obesity, remote history of tobacco abuse disorder    Aortic stenosis  Echo in June 2019 showed mild aortic stenosis with mean gradient of 14 mmHg. Echo in June 2020 with mean aortic valve gradient of 19 mmHg. Currently no symptoms of angina, syncope or heart failure. We recommend to continue clinical observation. Will repeat echo in 9 months    Hyperlipidemia:  Currently she is on atorvastatin 40 mg daily. Last LDL was 100. Continue same. Denies any side effect    Obesity:  Her weight is 178 pounds with BMI of 33. This plan was discussed with patient who is in agreement. Thank you for allowing me to participate in patient care. Please feel free to call me if you have any question or concern. Jamir Deleon MD  Please note: This document has been produced using voice recognition software. Unrecognized errors in transcription may be present.

## 2021-05-04 RX ORDER — LOSARTAN POTASSIUM 25 MG/1
TABLET ORAL
Qty: 90 TAB | Refills: 3 | Status: SHIPPED | OUTPATIENT
Start: 2021-05-04 | End: 2022-04-29

## 2021-05-04 RX ORDER — METOPROLOL SUCCINATE 25 MG/1
TABLET, EXTENDED RELEASE ORAL
Qty: 90 TAB | Refills: 3 | Status: SHIPPED | OUTPATIENT
Start: 2021-05-04 | End: 2022-03-28

## 2021-08-12 ENCOUNTER — IMPORTED ENCOUNTER (OUTPATIENT)
Dept: URBAN - NONMETROPOLITAN AREA CLINIC 1 | Facility: CLINIC | Age: 71
End: 2021-08-12

## 2021-08-12 PROCEDURE — 92014 COMPRE OPH EXAM EST PT 1/>: CPT

## 2021-12-02 ENCOUNTER — TELEPHONE (OUTPATIENT)
Dept: CARDIOLOGY CLINIC | Age: 71
End: 2021-12-02

## 2021-12-02 NOTE — TELEPHONE ENCOUNTER
Contacted pt at [facility] number. Two patient Identifiers confirmed. Advised pt per Dr Nila Long. Pt verbalized understanding.

## 2022-01-13 ENCOUNTER — OFFICE VISIT (OUTPATIENT)
Dept: CARDIOLOGY CLINIC | Age: 72
End: 2022-01-13
Payer: MEDICARE

## 2022-01-13 VITALS
BODY MASS INDEX: 28.41 KG/M2 | DIASTOLIC BLOOD PRESSURE: 74 MMHG | OXYGEN SATURATION: 97 % | HEART RATE: 60 BPM | SYSTOLIC BLOOD PRESSURE: 133 MMHG | WEIGHT: 181 LBS | HEIGHT: 67 IN

## 2022-01-13 DIAGNOSIS — R00.2 PALPITATIONS: ICD-10-CM

## 2022-01-13 DIAGNOSIS — I35.0 AORTIC VALVE STENOSIS, ETIOLOGY OF CARDIAC VALVE DISEASE UNSPECIFIED: Primary | ICD-10-CM

## 2022-01-13 DIAGNOSIS — R07.9 CHEST PAIN, UNSPECIFIED TYPE: ICD-10-CM

## 2022-01-13 PROCEDURE — 1090F PRES/ABSN URINE INCON ASSESS: CPT | Performed by: INTERNAL MEDICINE

## 2022-01-13 PROCEDURE — G8510 SCR DEP NEG, NO PLAN REQD: HCPCS | Performed by: INTERNAL MEDICINE

## 2022-01-13 PROCEDURE — G8399 PT W/DXA RESULTS DOCUMENT: HCPCS | Performed by: INTERNAL MEDICINE

## 2022-01-13 PROCEDURE — G8536 NO DOC ELDER MAL SCRN: HCPCS | Performed by: INTERNAL MEDICINE

## 2022-01-13 PROCEDURE — 3017F COLORECTAL CA SCREEN DOC REV: CPT | Performed by: INTERNAL MEDICINE

## 2022-01-13 PROCEDURE — 1101F PT FALLS ASSESS-DOCD LE1/YR: CPT | Performed by: INTERNAL MEDICINE

## 2022-01-13 PROCEDURE — 99214 OFFICE O/P EST MOD 30 MIN: CPT | Performed by: INTERNAL MEDICINE

## 2022-01-13 PROCEDURE — G8417 CALC BMI ABV UP PARAM F/U: HCPCS | Performed by: INTERNAL MEDICINE

## 2022-01-13 PROCEDURE — G8428 CUR MEDS NOT DOCUMENT: HCPCS | Performed by: INTERNAL MEDICINE

## 2022-01-13 RX ORDER — NITROGLYCERIN 0.4 MG/1
0.4 TABLET SUBLINGUAL
Qty: 25 TABLET | Refills: 3 | Status: SHIPPED | OUTPATIENT
Start: 2022-01-13 | End: 2022-07-11

## 2022-01-13 NOTE — PROGRESS NOTES
[facility]    Ms. Marcellus Ziegler is 73 y.o. female with history of hyperlipidemia, remote history of tobacco abuse disorder    Patient is here today for follow-up appointment. Patient denies any hospitalization or ER visits since last time. Occasional nighttime palpitations without any presyncope or syncope not affecting her lifestyle. Has been experiencing some chest tightness across upper chest without any nausea vomiting diaphoresis on a random occasion. No exertional symptoms still caring for 80year-old mother and  who also has Parkinson's  No limitation of activities  No in need of nitroglycerin use. This heaviness episode lasts only for 1 or 2-minute without any associated symptoms and not exertional    Past Medical History:   Diagnosis Date    Arthritis     Common migraine     HLD (hyperlipidemia)     Hx of vertigo     Left renal mass     Lung nodule     Renal cyst     Tobacco abuse, in remission          Past Surgical History:   Procedure Laterality Date    COLONOSCOPY N/A 10/12/2017    COLONOSCOPY performed by Ting Powers MD at [address] [place] [address] HX APPENDECTOMY      HX HEENT      wisdom teeth and molar extraction    HX HYSTERECTOMY      partial     HX TONSILLECTOMY         Current Outpatient Medications   Medication Sig    gabapentin (NEURONTIN) 100 mg capsule TAKE 1 CAPSULE BY MOUTH EVERY DAY AT BEDTIME FOR 1 WK, THEN TAKE 1 CAP TWICE A DAY    sertraline (ZOLOFT) 50 mg tablet     metoprolol succinate (TOPROL-XL) 25 mg XL tablet TAKE 1 TABLET BY MOUTH EVERY DAY    losartan (COZAAR) 25 mg tablet TAKE 1 TABLET BY MOUTH EVERY DAY    meclizine (ANTIVERT) 25 mg tablet     fluticasone propion-salmeteroL 113-14 mcg/actuation aepb INHALE 1 PUFF BY MOUTH TWICE DAILY. RINSE MOUTH AFTER USE    EPINEPHrine (EPIPEN) 0.3 mg/0.3 mL injection 0.3 mg by IntraMUSCular route as needed.     atorvastatin (LIPITOR) 40 mg tablet TAKE 1 TABLET BY MOUTH EVERYDAY AT BEDTIME    ergocalciferol (ERGOCALCIFEROL) 1,250 mcg (50,000 unit) capsule TAKE 1 CAPSULE BY MOUTH ONCE A WEEK    alendronate (FOSAMAX) 70 mg tablet TAKE 1 TABLET BY MOUTH EVERY 7 DAYS. Take with a full glass of water on an empty stomach. Remain upright x 1 hour post-medication.  nitroglycerin (NITROSTAT) 0.4 mg SL tablet 1 Tab by SubLINGual route every five (5) minutes as needed for Chest Pain. Up to 3 doses.  meloxicam (MOBIC) 7.5 mg tablet TAKE 1 TABLET BY MOUTH EVERY DAY    albuterol (PROVENTIL HFA, VENTOLIN HFA, PROAIR HFA) 90 mcg/actuation inhaler Take 2 Puffs by inhalation every four (4) hours as needed for Wheezing or Shortness of Breath. No current facility-administered medications for this visit. Allergies and Sensitivities:  Allergies   Allergen Reactions    Venom-Wasp Anaphylaxis    Omnicef [Cefdinir] Rash       Family History:  Family History   Problem Relation Age of Onset    OSTEOARTHRITIS Mother     Diabetes Mother     Elevated Lipids Mother     Hypertension Mother     Stroke Mother     Atrial Fibrillation Mother     Other Mother         CHF    Alcohol abuse Father     Cancer Maternal Aunt         Breast       Social History:  Social History     Tobacco Use    Smoking status: Former Smoker     Packs/day: 1.50     Years: 37.00     Pack years: 55.50     Types: Cigarettes     Quit date: 1/1/2007     Years since quitting: 15.0    Smokeless tobacco: Never Used   Substance Use Topics    Alcohol use: No    Drug use: No     She  reports that she quit smoking about 15 years ago. Her smoking use included cigarettes. She has a 55.50 pack-year smoking history. She has never used smokeless tobacco.  She  reports no history of alcohol use. Review of Systems:  Cardiac symptoms as noted above in HPI. All others negative.     Physical Exam:  BP Readings from Last 3 Encounters:   01/13/22 133/74   10/18/21 120/60   02/01/21 120/66         Pulse Readings from Last 3 Encounters:   01/13/22 60   02/01/21 71   08/24/20 (!) 53          Wt Readings from Last 3 Encounters:   01/13/22 82.1 kg (181 lb)   12/01/21 80.7 kg (178 lb)   10/18/21 80.7 kg (178 lb)       Constitutional: Oriented to person, place, and time. HENT: Head: Normocephalic and atraumatic. Neck: No JVD present. Cardiovascular: Regular rhythm. No gallop or rubs appreciated. TEENA on aortic area  Lung: Breath sounds normal. No respiratory distress. No ronchi or rales appreciated  Abdominal: No tenderness. No rebound and no guarding. Musculoskeletal: There is no lower extremity edema. No cynosis      Review of Data  LABS:   Lab Results   Component Value Date/Time    Sodium 141 07/21/2020 01:52 PM    Potassium 4.0 07/21/2020 01:52 PM    Chloride 108 07/21/2020 01:52 PM    CO2 28 07/21/2020 01:52 PM    Glucose 94 07/21/2020 01:52 PM    BUN 15 07/21/2020 01:52 PM    Creatinine 0.70 07/21/2020 01:52 PM     Lipids Latest Ref Rng & Units 5/20/2019 1/3/2018   Chol, Total <200 MG/ 218(H)   HDL 40 - 60 MG/DL 56 54   LDL 0 - 100 MG/. 8(H) 112. 8(H)   Trig <150 MG/(H) 256(H)   Chol/HDL Ratio 0 - 5.0   3.6 4.0   Some recent data might be hidden     Lab Results   Component Value Date/Time    ALT (SGPT) 35 05/20/2019 11:47 AM     No results found for: HBA1C, DUK9WWPE, EOI5NILR, PAL7CTMW    EKG  (06/19) sinus rhythm at 68 bpm.  Poor R wave progression. No pathologic Q waves    HOLTER (06/20)  Ashley Fraction was in Normal Sinus . The average heart rate, excluding ectopy, was 60 BPM with a minimum of 44 BPM at 04:07 D2 and a maximum of 110 BPM at 14:18 D1. Heart beats, including ectopy, totaled [id number] beats. VENTRICULAR ECTOPICS totaled 8 averaging 0.2 per hour with 8 single, 0 paired, 0 trigeminy and 0 R on T.  SUPRAVENTRICULAR ECTOPICS totaled 491 averaging 10.3 per hour ,with 352 single and 36 paired beats. SUPRAVENTRICULAR TACHYCARDIA occurred 17 times.   The fastest run was at 165 BPM and occurred at 13:39 D1 with 3 beats. The longest run was 17 beats at 02:42 D2 at a rate of 117 BPM.  Patient diary was submitted, no symptoms noted. No patient triggered events noted. ECHO (06/20)  Left Ventricle  Normal cavity size, wall thickness, systolic function (ejection fraction normal) and diastolic function. The estimated ejection fraction is 55 - 60%. Visually measured ejection fraction. LV wall motion is noted to be no regional wall motion abnormality. Wall Scoring  The left ventricular wall motion is normal.             Left Atrium  Normal cavity size. Left Atrium volume index is 25.43 mL/m2. Right Ventricle  Normal cavity size and global systolic function. Right Atrium  Normal cavity size. Aortic Valve  Probably trileaflet aortic valve. There is leaflet calcification. Aortic valve peak gradient is 34.5 mmHg. Aortic valve mean gradient is 18.9 mmHg. Aortic valve area is 1.1 cm2. Aortic valve peak velocity is 294 cm/s. There is mild to moderate aortic stenosis. Trace aortic valve regurgitation. Mitral Valve  No stenosis. Mitral valve non-specific thickening. Trace regurgitation. Tricuspid Valve  Normal valve structure and no stenosis. Trace regurgitation. Pulmonic Valve  Normal valve structure, no stenosis and no regurgitation. Aorta  Normal aortic root and ascending aorta. Pulmonary Artery  Pulmonary arterial systolic pressure (PASP) is 31 mmHg. Pulmonary hypertension not suggested by Doppler findings. STRESS TEST (06/19)  · Baseline ECG: Sinus bradycardia, non-specific ST-T wave abnormalities. · Inconclusive stress test.  · Gated SPECT: Left ventricular function post-stress was normal. Calculated ejection fraction is 69%. · Left ventricular perfusion is probably normal.  · There was no convincing evidence of significant reversible defect to suggest on going major ischemia.  Inferior defect is most consistent with diaphragmatic attenuation artifact  · Myocardial perfusion imaging supports a low risk stress test.     IMPRESSION & PLAN:  Ms. Bacilio Page is 73 y.o. female with hyperlipidemia, obesity, remote history of tobacco abuse disorder    Aortic stenosis  Echo in June 2019 showed mild aortic stenosis with mean gradient of 14 mmHg. Echo in June 2020 with mean aortic valve gradient of 19 mmHg. Echo in 12/2021 with P gradient 30 and mean gradient of 21 mmHg, mild to moderate AAS  Currently no symptoms of angina, syncope or heart failure. We recommend to continue clinical observation. Will repeat echo in 12 months    Hyperlipidemia:  Currently she is on atorvastatin 40 mg daily. Last LDL was 100. Continue same. This is being checked by PCP    Chest discomfort:  Somewhat atypical for angina however has multiple risk factors. No associated symptoms. No exertional symptoms  Discussed coronary evaluation however as symptoms are not limiting and not exertional and without any associated symptoms, she would like to observe for few more weeks and if no improvement, she will call me  We will asked patient to start taking aspirin 81 mg daily. She has nitroglycerin however not used it  Have asked patient to use nitroglycerin as needed. Symptoms mentioned in detail. Have advised patient to call [phone] if no improvement with nitroglycerin    This plan was discussed with patient who is in agreement. Thank you for allowing me to participate in patient care. Please feel free to call me if you have any question or concern. Alexandru Arndt MD  Please note: This document has been produced using voice recognition software. Unrecognized errors in transcription may be present.

## 2022-01-13 NOTE — LETTER
1/13/2022    Patient: Jo Bunch   YOB: 1950   Date of Visit: 1/13/2022     Frances Beltran MD  [place] [id number]  Via Fax: [phone]    Dear Frances Beltran MD,      Thank you for referring Ms. Jo Bunch to [facility] SPECIALIST AT [facility] for evaluation. My notes for this consultation are attached. If you have questions, please do not hesitate to call me. I look forward to following your patient along with you.       Sincerely,    Mony Jara MD

## 2022-01-13 NOTE — PROGRESS NOTES
Idalia Bautista presents today for   Chief Complaint   Patient presents with    Follow-up     after testing        Idalia Bautista preferred language for health care discussion is english/other. Is someone accompanying this pt? no    Is the patient using any DME equipment during [address]? no    Depression Screening:  3 most recent PHQ Screens 1/13/2022   Little interest or pleasure in doing things Not at all   Feeling down, depressed, irritable, or hopeless Not at all   Total Score PHQ 2 0       Learning Assessment:  Learning Assessment 6/19/2014   PRIMARY LEARNER Patient   HIGHEST LEVEL OF EDUCATION - PRIMARY LEARNER  GRADUATED HIGH SCHOOL OR GED   BARRIERS PRIMARY LEARNER NONE   CO-LEARNER CAREGIVER No   PRIMARY LANGUAGE ENGLISH   LEARNER PREFERENCE PRIMARY LISTENING   ANSWERED BY Patient    RELATIONSHIP SELF       Abuse Screening:  Abuse Screening Questionnaire 7/5/2018   Do you ever feel afraid of your partner? N   Are you in a relationship with someone who physically or mentally threatens you? N   Is it safe for you to go home? Y       Fall Risk  Fall Risk Assessment, last 12 mths 1/13/2022   Able to walk? Yes   Fall in past 12 months? 0   Do you feel unsteady? 0   Are you worried about falling 0       Pt currently taking Anticoagulant therapy? no    Coordination of Care:  1. Have you been to the ER, urgent care clinic since your last visit? Hospitalized since your last visit? no    2. Have you seen or consulted any other health care providers outside of the [address] [place] [address] since your last visit? Include any pap smears or colon screening.  no

## 2022-03-19 PROBLEM — Z13.31 SCREENING FOR DEPRESSION: Status: ACTIVE | Noted: 2018-07-05

## 2022-03-28 RX ORDER — METOPROLOL SUCCINATE 25 MG/1
TABLET, EXTENDED RELEASE ORAL
Qty: 90 TABLET | Refills: 3 | Status: SHIPPED | OUTPATIENT
Start: 2022-03-28

## 2022-04-10 ASSESSMENT — VISUAL ACUITY
OU_SC: 20/25
OS_SC: 20/20
OD_CC: 20/30
OS_CC: 20/150
OD_SC: 20/30-1
OU_CC: 20/30
OD_SC: 20/30
OS_CC: 20/30
OS_SC: 20/25-1

## 2022-04-10 ASSESSMENT — TONOMETRY
OS_IOP_MMHG: 15
OD_IOP_MMHG: 15
OS_IOP_MMHG: 14
OD_IOP_MMHG: 14

## 2022-04-21 ENCOUNTER — OFFICE VISIT (OUTPATIENT)
Dept: CARDIOLOGY CLINIC | Age: 72
End: 2022-04-21
Payer: MEDICARE

## 2022-04-21 VITALS
TEMPERATURE: 97 F | HEART RATE: 61 BPM | BODY MASS INDEX: 28.1 KG/M2 | DIASTOLIC BLOOD PRESSURE: 82 MMHG | WEIGHT: 179.4 LBS | SYSTOLIC BLOOD PRESSURE: 128 MMHG

## 2022-04-21 DIAGNOSIS — E78.00 PURE HYPERCHOLESTEROLEMIA: ICD-10-CM

## 2022-04-21 DIAGNOSIS — R07.9 CHEST PAIN, UNSPECIFIED TYPE: Primary | ICD-10-CM

## 2022-04-21 DIAGNOSIS — I10 ESSENTIAL HYPERTENSION WITH GOAL BLOOD PRESSURE LESS THAN 140/90: ICD-10-CM

## 2022-04-21 PROCEDURE — G8536 NO DOC ELDER MAL SCRN: HCPCS | Performed by: INTERNAL MEDICINE

## 2022-04-21 PROCEDURE — G8510 SCR DEP NEG, NO PLAN REQD: HCPCS | Performed by: INTERNAL MEDICINE

## 2022-04-21 PROCEDURE — G8428 CUR MEDS NOT DOCUMENT: HCPCS | Performed by: INTERNAL MEDICINE

## 2022-04-21 PROCEDURE — 1101F PT FALLS ASSESS-DOCD LE1/YR: CPT | Performed by: INTERNAL MEDICINE

## 2022-04-21 PROCEDURE — 99214 OFFICE O/P EST MOD 30 MIN: CPT | Performed by: INTERNAL MEDICINE

## 2022-04-21 PROCEDURE — G8752 SYS BP LESS 140: HCPCS | Performed by: INTERNAL MEDICINE

## 2022-04-21 PROCEDURE — G8419 CALC BMI OUT NRM PARAM NOF/U: HCPCS | Performed by: INTERNAL MEDICINE

## 2022-04-21 PROCEDURE — G8399 PT W/DXA RESULTS DOCUMENT: HCPCS | Performed by: INTERNAL MEDICINE

## 2022-04-21 PROCEDURE — 93000 ELECTROCARDIOGRAM COMPLETE: CPT | Performed by: INTERNAL MEDICINE

## 2022-04-21 PROCEDURE — 3017F COLORECTAL CA SCREEN DOC REV: CPT | Performed by: INTERNAL MEDICINE

## 2022-04-21 PROCEDURE — G8754 DIAS BP LESS 90: HCPCS | Performed by: INTERNAL MEDICINE

## 2022-04-21 PROCEDURE — 1090F PRES/ABSN URINE INCON ASSESS: CPT | Performed by: INTERNAL MEDICINE

## 2022-04-21 NOTE — LETTER
4/21/2022    Patient: Spring Garcia   YOB: 1950   Date of Visit: 4/21/2022     Chano Mcfarlane MD  [place] [address]  Via Fax: [phone]    Dear Chano Mcfarlane MD,      Thank you for referring Ms. Spring Garcia to [facility] SPECIALIST AT [facility] for evaluation. My notes for this consultation are attached. If you have questions, please do not hesitate to call me. I look forward to following your patient along with you.       Sincerely,    Kiran Olvera MD

## 2022-04-21 NOTE — PROGRESS NOTES
Identified pt with two pt identifiers(name and ). Reviewed record in preparation for visit and have obtained necessary documentation. Nathanael Cohen presents today for   Chief Complaint   Patient presents with    Follow-up       Pt c/o  CHEST PAIN            Nathanael Cohen preferred language for health care discussion is english/other. Personal Protective Equipment:   Personal Protective Equipment was used including: mask-surgical and hands-gloves. Patient was placed on no precaution(s). Patient was masked. Precautions:   Patient currently on None  Patient currently roomed with door closed. Is someone accompanying this pt? no    Is the patient using any DME equipment during [address]? No     Depression Screening:  3 most recent PHQ Screens 2022   Little interest or pleasure in doing things Not at all   Feeling down, depressed, irritable, or hopeless Not at all   Total Score PHQ 2 0       Learning Assessment:  Learning Assessment 2014   PRIMARY LEARNER Patient   HIGHEST LEVEL OF EDUCATION - PRIMARY LEARNER  GRADUATED HIGH SCHOOL OR GED   BARRIERS PRIMARY LEARNER NONE   CO-LEARNER CAREGIVER No   PRIMARY LANGUAGE ENGLISH   LEARNER PREFERENCE PRIMARY LISTENING   ANSWERED BY Patient    RELATIONSHIP SELF       Abuse Screening:  Abuse Screening Questionnaire 2018   Do you ever feel afraid of your partner? N   Are you in a relationship with someone who physically or mentally threatens you? N   Is it safe for you to go home? Y       Fall Risk  Fall Risk Assessment, last 12 mths 2022   Able to walk? Yes   Fall in past 12 months? 0   Do you feel unsteady? 0   Are you worried about falling 0       Pt currently taking Anticoagulant therapy? No   Pt currently taking Antiplatelet therapy? No     Coordination of Care:  1. Have you been to the ER, urgent care clinic since your last visit? Hospitalized since your last visit? No     2.  Have you seen or consulted any other health care providers outside of the [address] since your last visit? Include any pap smears or colon screening. Yes        Please see Red banners under Allergies and Med Rec to remove outside inquires. All correct information has been verified with patient and added to chart.      Medication's patient's would liked removed has been marked not taking to be removed per Verbal order and read back per Bruce Ellsworth MD

## 2022-04-21 NOTE — PROGRESS NOTES
[facility]    Ms. Soheila Carrasco is 81 y.o. female with history of hyperlipidemia, remote history of tobacco abuse disorder    Patient is here today for follow-up appointment. Patient denies any hospitalization or ER visits since last time. Patient had episode of funny feeling in the chest last night while she was about to go to bed. She described a sense of pins and needle sensation lasting only for few seconds. She does not report any use of nitroglycerin since last time. She denies presyncope or syncope. She is able to perform activity of daily living without any limitation  No in need of nitroglycerin use. This heaviness episode lasts only for 1 or 2-minute without any associated symptoms and not exertional    Past Medical History:   Diagnosis Date    Arthritis     Common migraine     HLD (hyperlipidemia)     Hx of vertigo     Left renal mass     Lung nodule     Renal cyst     Tobacco abuse, in remission          Past Surgical History:   Procedure Laterality Date    COLONOSCOPY N/A 10/12/2017    COLONOSCOPY performed by Jamila Mays MD at [address] [place] [address] HX APPENDECTOMY      HX HEENT      wisdom teeth and molar extraction    HX HYSTERECTOMY      partial     HX TONSILLECTOMY         Current Outpatient Medications   Medication Sig    metoprolol succinate (TOPROL-XL) 25 mg XL tablet TAKE 1 TABLET BY MOUTH EVERY DAY    losartan (COZAAR) 25 mg tablet TAKE 1 TABLET BY MOUTH EVERY DAY    atorvastatin (LIPITOR) 40 mg tablet TAKE 1 TABLET BY MOUTH EVERYDAY AT BEDTIME    nitroglycerin (NITROSTAT) 0.4 mg SL tablet 1 Tab by SubLINGual route every five (5) minutes as needed for Chest Pain. Up to 3 doses.  nitroglycerin (NITROSTAT) 0.4 mg SL tablet 1 Tablet by SubLINGual route every five (5) minutes as needed for Chest Pain. Up to 3 doses.     gabapentin (NEURONTIN) 100 mg capsule TAKE 1 CAPSULE BY MOUTH EVERY DAY AT BEDTIME FOR 1 WK, THEN TAKE 1 CAP TWICE A DAY    sertraline (ZOLOFT) 50 mg tablet     meclizine (ANTIVERT) 25 mg tablet     fluticasone propion-salmeteroL 113-14 mcg/actuation aepb INHALE 1 PUFF BY MOUTH TWICE DAILY. RINSE MOUTH AFTER USE    EPINEPHrine (EPIPEN) 0.3 mg/0.3 mL injection 0.3 mg by IntraMUSCular route as needed.  ergocalciferol (ERGOCALCIFEROL) 1,250 mcg (50,000 unit) capsule TAKE 1 CAPSULE BY MOUTH ONCE A WEEK    alendronate (FOSAMAX) 70 mg tablet TAKE 1 TABLET BY MOUTH EVERY 7 DAYS. Take with a full glass of water on an empty stomach. Remain upright x 1 hour post-medication.  meloxicam (MOBIC) 7.5 mg tablet TAKE 1 TABLET BY MOUTH EVERY DAY    albuterol (PROVENTIL HFA, VENTOLIN HFA, PROAIR HFA) 90 mcg/actuation inhaler Take 2 Puffs by inhalation every four (4) hours as needed for Wheezing or Shortness of Breath. No current facility-administered medications for this visit. Allergies and Sensitivities:  Allergies   Allergen Reactions    Venom-Wasp Anaphylaxis    Omnicef [Cefdinir] Rash       Family History:  Family History   Problem Relation Age of Onset    OSTEOARTHRITIS Mother     Diabetes Mother     Elevated Lipids Mother     Hypertension Mother     Stroke Mother     Atrial Fibrillation Mother     Other Mother         CHF    Alcohol abuse Father     Cancer Maternal Aunt         Breast       Social History:  Social History     Tobacco Use    Smoking status: Former Smoker     Packs/day: 1.50     Years: 37.00     Pack years: 55.50     Types: Cigarettes     Quit date: 1/1/2007     Years since quitting: 15.3    Smokeless tobacco: Never Used   Substance Use Topics    Alcohol use: No    Drug use: No     She  reports that she quit smoking about 15 years ago. Her smoking use included cigarettes. She has a 55.50 pack-year smoking history. She has never used smokeless tobacco.  She  reports no history of alcohol use. Review of Systems:  Cardiac symptoms as noted above in HPI.  All others negative. Physical Exam:  BP Readings from Last 3 Encounters:   04/21/22 128/82   01/13/22 133/74   10/18/21 120/60         Pulse Readings from Last 3 Encounters:   04/21/22 61   01/13/22 60   02/01/21 71          Wt Readings from Last 3 Encounters:   04/21/22 81.4 kg (179 lb 6.4 oz)   01/13/22 82.1 kg (181 lb)   12/01/21 80.7 kg (178 lb)       Constitutional: Oriented to person, place, and time. HENT: Head: Normocephalic and atraumatic. Neck: No JVD present. Cardiovascular: Regular rhythm. No gallop or rubs appreciated. TEENA on aortic area  Lung: Breath sounds normal. No respiratory distress. No ronchi or rales appreciated  Abdominal: No tenderness. No rebound and no guarding. Musculoskeletal: There is no lower extremity edema. No cynosis      Review of Data  LABS:   Lab Results   Component Value Date/Time    Sodium 141 07/21/2020 01:52 PM    Potassium 4.0 07/21/2020 01:52 PM    Chloride 108 07/21/2020 01:52 PM    CO2 28 07/21/2020 01:52 PM    Glucose 94 07/21/2020 01:52 PM    BUN 15 07/21/2020 01:52 PM    Creatinine 0.70 07/21/2020 01:52 PM     Lipids Latest Ref Rng & Units 5/20/2019   Chol, Total <200 MG/   HDL 40 - 60 MG/DL 56   LDL 0 - 100 MG/. 8(H)   Trig <150 MG/(H)   Chol/HDL Ratio 0 - 5.0   3.6   Some recent data might be hidden     Lab Results   Component Value Date/Time    ALT (SGPT) 35 05/20/2019 11:47 AM     No results found for: HBA1C, KLJ7OGZC, HSV0GEWY, HHR3MRSC    EKG  (06/19) sinus rhythm at 68 bpm.  Poor R wave progression. No pathologic Q waves    HOLTER (06/20)  Edie Gomes was in Normal Sinus . The average heart rate, excluding ectopy, was 60 BPM with a minimum of 44 BPM at 04:07 D2 and a maximum of 110 BPM at 14:18 D1. Heart beats, including ectopy, totaled 840032 beats.   VENTRICULAR ECTOPICS totaled 8 averaging 0.2 per hour with 8 single, 0 paired, 0 trigeminy and 0 R on T.  SUPRAVENTRICULAR ECTOPICS totaled 491 averaging 10.3 per hour ,with 352 single and 36 paired beats. SUPRAVENTRICULAR TACHYCARDIA occurred 17 times. The fastest run was at 165 BPM and occurred at 13:39 D1 with 3 beats. The longest run was 17 beats at 02:42 D2 at a rate of 117 BPM.  Patient diary was submitted, no symptoms noted. No patient triggered events noted. ECHO (06/20)  Left Ventricle  Normal cavity size, wall thickness, systolic function (ejection fraction normal) and diastolic function. The estimated ejection fraction is 55 - 60%. Visually measured ejection fraction. LV wall motion is noted to be no regional wall motion abnormality. Wall Scoring  The left ventricular wall motion is normal.             Left Atrium  Normal cavity size. Left Atrium volume index is 25.43 mL/m2. Right Ventricle  Normal cavity size and global systolic function. Right Atrium  Normal cavity size. Aortic Valve  Probably trileaflet aortic valve. There is leaflet calcification. Aortic valve peak gradient is 34.5 mmHg. Aortic valve mean gradient is 18.9 mmHg. Aortic valve area is 1.1 cm2. Aortic valve peak velocity is 294 cm/s. There is mild to moderate aortic stenosis. Trace aortic valve regurgitation. Mitral Valve  No stenosis. Mitral valve non-specific thickening. Trace regurgitation. Tricuspid Valve  Normal valve structure and no stenosis. Trace regurgitation. Pulmonic Valve  Normal valve structure, no stenosis and no regurgitation. Aorta  Normal aortic root and ascending aorta. Pulmonary Artery  Pulmonary arterial systolic pressure (PASP) is 31 mmHg. Pulmonary hypertension not suggested by Doppler findings. STRESS TEST (06/19)  · Baseline ECG: Sinus bradycardia, non-specific ST-T wave abnormalities. · Inconclusive stress test.  · Gated SPECT: Left ventricular function post-stress was normal. Calculated ejection fraction is 69%.   · Left ventricular perfusion is probably normal.  · There was no convincing evidence of significant reversible defect to suggest on going major ischemia. Inferior defect is most consistent with diaphragmatic attenuation artifact  · Myocardial perfusion imaging supports a low risk stress test.     IMPRESSION & PLAN:  Ms. Samira Nair is 81 y.o. female with hyperlipidemia, obesity, remote history of tobacco abuse disorder    Aortic stenosis  Echo in June 2019 showed mild aortic stenosis with mean gradient of 14 mmHg. Echo in June 2020 with mean aortic valve gradient of 19 mmHg. Echo in 12/2021 with Peak gradient 30 and mean gradient of 21 mmHg, mild to moderate AS  Currently no symptoms of angina, syncope or heart failure. We recommend to continue clinical observation. Will repeat echo in 12 months    Hyperlipidemia:  Currently she is on atorvastatin 40 mg daily. Last LDL was 100. Continue same. This is being checked by PCP    Chest discomfort:  Somewhat atypical for angina however has multiple risk factors. No associated symptoms. No exertional symptoms  Episode last night was very atypical for angina. Continue aspirin. Have advised patient to use nitroglycerin for any angina episode. Does not appear to have any angina at this time. She understand the anginal symptoms. This plan was discussed with patient who is in agreement. Thank you for allowing me to participate in patient care. Please feel free to call me if you have any question or concern. Kristy Coronel MD  Please note: This document has been produced using voice recognition software. Unrecognized errors in transcription may be present.

## 2022-04-29 RX ORDER — LOSARTAN POTASSIUM 25 MG/1
TABLET ORAL
Qty: 90 TABLET | Refills: 3 | Status: SHIPPED | OUTPATIENT
Start: 2022-04-29

## 2022-06-02 NOTE — PROGRESS NOTES
I have attempted to contact this patient by phone with the following results: no answer, left message to return my call on answering machine.     Patient presents to the clinic for cough that began 2 days ago. Patient complains of chest congestion and SOB. Patient states she has tried delsym and received no relief.

## 2022-07-11 RX ORDER — NITROGLYCERIN 0.4 MG/1
TABLET SUBLINGUAL
Qty: 75 TABLET | Refills: 1 | Status: SHIPPED | OUTPATIENT
Start: 2022-07-11

## 2022-08-17 ENCOUNTER — COMPREHENSIVE EXAM (OUTPATIENT)
Dept: RURAL CLINIC 1 | Facility: CLINIC | Age: 72
End: 2022-08-17

## 2022-08-17 DIAGNOSIS — H52.03: ICD-10-CM

## 2022-08-17 DIAGNOSIS — H25.13: ICD-10-CM

## 2022-08-17 PROCEDURE — 92014 COMPRE OPH EXAM EST PT 1/>: CPT

## 2022-08-17 ASSESSMENT — VISUAL ACUITY
OD_CC: 20/25-2
OU_CC: 20/20
OD_CC: 20/20
OS_CC: 20/25-1
OU_CC: 20/25
OS_CC: 20/20

## 2022-08-17 ASSESSMENT — TONOMETRY
OD_IOP_MMHG: 15
OS_IOP_MMHG: 15

## 2022-10-20 ENCOUNTER — OFFICE VISIT (OUTPATIENT)
Dept: CARDIOLOGY CLINIC | Age: 72
End: 2022-10-20
Payer: MEDICARE

## 2022-10-20 VITALS
BODY MASS INDEX: 33.56 KG/M2 | OXYGEN SATURATION: 99 % | DIASTOLIC BLOOD PRESSURE: 70 MMHG | TEMPERATURE: 97.7 F | SYSTOLIC BLOOD PRESSURE: 124 MMHG | WEIGHT: 182 LBS | HEART RATE: 67 BPM

## 2022-10-20 DIAGNOSIS — I35.0 AORTIC VALVE STENOSIS, ETIOLOGY OF CARDIAC VALVE DISEASE UNSPECIFIED: Primary | ICD-10-CM

## 2022-10-20 PROCEDURE — G8536 NO DOC ELDER MAL SCRN: HCPCS | Performed by: INTERNAL MEDICINE

## 2022-10-20 PROCEDURE — G8752 SYS BP LESS 140: HCPCS | Performed by: INTERNAL MEDICINE

## 2022-10-20 PROCEDURE — G8399 PT W/DXA RESULTS DOCUMENT: HCPCS | Performed by: INTERNAL MEDICINE

## 2022-10-20 PROCEDURE — 99213 OFFICE O/P EST LOW 20 MIN: CPT | Performed by: INTERNAL MEDICINE

## 2022-10-20 PROCEDURE — 3017F COLORECTAL CA SCREEN DOC REV: CPT | Performed by: INTERNAL MEDICINE

## 2022-10-20 PROCEDURE — G8427 DOCREV CUR MEDS BY ELIG CLIN: HCPCS | Performed by: INTERNAL MEDICINE

## 2022-10-20 PROCEDURE — 1123F ACP DISCUSS/DSCN MKR DOCD: CPT | Performed by: INTERNAL MEDICINE

## 2022-10-20 PROCEDURE — G8754 DIAS BP LESS 90: HCPCS | Performed by: INTERNAL MEDICINE

## 2022-10-20 PROCEDURE — G8417 CALC BMI ABV UP PARAM F/U: HCPCS | Performed by: INTERNAL MEDICINE

## 2022-10-20 PROCEDURE — G8510 SCR DEP NEG, NO PLAN REQD: HCPCS | Performed by: INTERNAL MEDICINE

## 2022-10-20 PROCEDURE — 1101F PT FALLS ASSESS-DOCD LE1/YR: CPT | Performed by: INTERNAL MEDICINE

## 2022-10-20 PROCEDURE — 1090F PRES/ABSN URINE INCON ASSESS: CPT | Performed by: INTERNAL MEDICINE

## 2022-10-20 NOTE — LETTER
10/20/2022    Patient: Tatiana Blackman   YOB: 1950   Date of Visit: 10/20/2022     Mikel Horne MD  [place] [id number]  Via Fax: [phone]    Dear Mikel Horne MD,      Thank you for referring Ms. Tatiana Blackman to [facility] AT [facility] for evaluation. My notes for this consultation are attached. If you have questions, please do not hesitate to call me. I look forward to following your patient along with you.       Sincerely,    Camille Dudley MD

## 2022-10-20 NOTE — PROGRESS NOTES
Identified pt with two pt identifiers(name and ). Reviewed record in preparation for visit and have obtained necessary documentation. Bel Bojorquez presents today for   Chief Complaint   Patient presents with    Follow-up       Pt c/o SOB, heart flutter. Bel Bojorquez preferred language for health care discussion is english/other. Personal Protective Equipment:   Personal Protective Equipment was used including: mask-surgical and hands-gloves. Patient was placed on no precaution(s). Patient was masked. Precautions:   Patient currently on None  Patient currently roomed with door closed. Is someone accompanying this pt? Yes,     Is the patient using any DME equipment during [address]? no    Depression Screening:  3 most recent PHQ Screens 10/20/2022   Little interest or pleasure in doing things Not at all   Feeling down, depressed, irritable, or hopeless Not at all   Total Score PHQ 2 0       Learning Assessment:  Learning Assessment 2014   PRIMARY LEARNER Patient   HIGHEST LEVEL OF EDUCATION - PRIMARY LEARNER  GRADUATED HIGH SCHOOL OR GED   BARRIERS PRIMARY LEARNER NONE   CO-LEARNER CAREGIVER No   PRIMARY LANGUAGE ENGLISH   LEARNER PREFERENCE PRIMARY LISTENING   ANSWERED BY Patient    RELATIONSHIP SELF       Abuse Screening:  Abuse Screening Questionnaire 2018   Do you ever feel afraid of your partner? N   Are you in a relationship with someone who physically or mentally threatens you? N   Is it safe for you to go home? Y       Fall Risk  Fall Risk Assessment, last 12 mths 2022   Able to walk? Yes   Fall in past 12 months? 0   Do you feel unsteady? 0   Are you worried about falling 0       Pt currently taking Anticoagulant therapy? no  Pt currently taking Antiplatelet therapy? no    Coordination of Care:  1. Have you been to the ER, urgent care clinic since your last visit? Hospitalized since your last visit? no    2.  Have you seen or consulted any other health care providers outside of the [address] [place] [address] [place] since your last visit? Include any pap smears or colon screening. no      Please see Red banners under Allergies and Med Rec to remove outside inquires. All correct information has been verified with patient and added to chart.      Medication's patient's would liked removed has been marked not taking to be removed per Verbal order and read back per Mony Jara MD

## 2022-10-20 NOTE — PATIENT INSTRUCTIONS
Testing   Limited echo in 9 months at Dr. Danial Robin office    **call office 3-5 days after testing is completed for results**

## 2022-10-20 NOTE — PROGRESS NOTES
[facility]    Ms. Marcellus Ziegler is 81 y.o. female with history of hyperlipidemia, remote history of tobacco abuse disorder    Patient is here today for follow-up appointment. Patient denies any hospitalization or ER visits since last time. Denies any chest pain or chest tightness concerning for angina. Denies any PND or lower extremity swelling denies any palpitation, presyncope or syncope. Able to perform function of daily living without any limitation      Past Medical History:   Diagnosis Date    Arthritis     Common migraine     HLD (hyperlipidemia)     Hx of vertigo     Left renal mass     Lung nodule     Renal cyst     Tobacco abuse, in remission          Past Surgical History:   Procedure Laterality Date    COLONOSCOPY N/A 10/12/2017    COLONOSCOPY performed by Ting Powers MD at [facility] ENDOSCOPY    HX APPENDECTOMY      HX HEENT      wisdom teeth and molar extraction    HX HYSTERECTOMY      partial     HX TONSILLECTOMY         Current Outpatient Medications   Medication Sig    nitroglycerin (NITROSTAT) 0.4 mg SL tablet PLACE 1 TABLET SUBLINGUAL EVERY 5 MIN AS NEEDED FOR CHEST PAIN. IF MORE THAN 3 DOSES NEEDED, [id number]    losartan (COZAAR) 25 mg tablet TAKE 1 TABLET BY MOUTH EVERY DAY    metoprolol succinate (TOPROL-XL) 25 mg XL tablet TAKE 1 TABLET BY MOUTH EVERY DAY    atorvastatin (LIPITOR) 40 mg tablet TAKE 1 TABLET BY MOUTH EVERYDAY AT BEDTIME    gabapentin (NEURONTIN) 100 mg capsule TAKE 1 CAPSULE BY MOUTH EVERY DAY AT BEDTIME FOR 1 WK, THEN TAKE 1 CAP TWICE A DAY    sertraline (ZOLOFT) 50 mg tablet     fluticasone propion-salmeteroL 113-14 mcg/actuation aepb INHALE 1 PUFF BY MOUTH TWICE DAILY. RINSE MOUTH AFTER USE    EPINEPHrine (EPIPEN) 0.3 mg/0.3 mL injection 0.3 mg by IntraMUSCular route as needed.     ergocalciferol (ERGOCALCIFEROL) 1,250 mcg (50,000 unit) capsule TAKE 1 CAPSULE BY MOUTH ONCE A WEEK    alendronate (FOSAMAX) 70 mg tablet TAKE 1 TABLET BY MOUTH EVERY 7 DAYS. Take with a full glass of water on an empty stomach. Remain upright x 1 hour post-medication. meloxicam (MOBIC) 7.5 mg tablet TAKE 1 TABLET BY MOUTH EVERY DAY    albuterol (PROVENTIL HFA, VENTOLIN HFA, PROAIR HFA) 90 mcg/actuation inhaler Take 2 Puffs by inhalation every four (4) hours as needed for Wheezing or Shortness of Breath. No current facility-administered medications for this visit. Allergies and Sensitivities:  Allergies   Allergen Reactions    Venom-Wasp Anaphylaxis    Omnicef [Cefdinir] Rash       Family History:  Family History   Problem Relation Age of Onset    OSTEOARTHRITIS Mother     Diabetes Mother     Elevated Lipids Mother     Hypertension Mother     Stroke Mother     Atrial Fibrillation Mother     Other Mother         CHF    Alcohol abuse Father     Cancer Maternal Aunt         Breast       Social History:  Social History     Tobacco Use    Smoking status: Former     Packs/day: 1.50     Years: 37.00     Pack years: 55.50     Types: Cigarettes     Quit date: 1/1/2007     Years since quitting: 15.8    Smokeless tobacco: Never   Substance Use Topics    Alcohol use: No    Drug use: No     She  reports that she quit smoking about 15 years ago. Her smoking use included cigarettes. She has a 55.50 pack-year smoking history. She has never used smokeless tobacco.  She  reports no history of alcohol use. Review of Systems:  Cardiac symptoms as noted above in HPI. All others negative. Physical Exam:  BP Readings from Last 3 Encounters:   10/20/22 124/70   04/21/22 128/82   01/13/22 133/74         Pulse Readings from Last 3 Encounters:   10/20/22 67   04/21/22 61   01/13/22 60          Wt Readings from Last 3 Encounters:   10/20/22 82.6 kg (182 lb)   06/01/22 81.2 kg (179 lb)   04/21/22 81.4 kg (179 lb 6.4 oz)       Constitutional: Oriented to person, place, and time. HENT: Head: Normocephalic and atraumatic. Neck: No JVD present. Cardiovascular: Regular rhythm. No gallop or rubs appreciated. TEENA 3/6 on aortic area  Lung: Breath sounds normal. No respiratory distress. No ronchi or rales appreciated  Abdominal: No tenderness. No rebound and no guarding. Musculoskeletal: There is no lower extremity edema. No cynosis      Review of Data  LABS:   Lab Results   Component Value Date/Time    Sodium 141 07/21/2020 01:52 PM    Potassium 4.0 07/21/2020 01:52 PM    Chloride 108 07/21/2020 01:52 PM    CO2 28 07/21/2020 01:52 PM    Glucose 94 07/21/2020 01:52 PM    BUN 15 07/21/2020 01:52 PM    Creatinine 0.70 07/21/2020 01:52 PM     Lipids Latest Ref Rng & Units 5/20/2019   Chol, Total <200 MG/   HDL 40 - 60 MG/DL 56   LDL 0 - 100 MG/. 8(H)   Trig <150 MG/(H)   Chol/HDL Ratio 0 - 5.0   3.6   Some recent data might be hidden     Lab Results   Component Value Date/Time    ALT (SGPT) 35 05/20/2019 11:47 AM     No results found for: HBA1C, AWI9PGPN, HTJ0VYDP, LJY6ZNJW    EKG  (06/19) sinus rhythm at 68 bpm.  Poor R wave progression. No pathologic Q waves    HOLTER (06/20)  Mary Singletary was in Normal Sinus . The average heart rate, excluding ectopy, was 60 BPM with a minimum of 44 BPM at 04:07 D2 and a maximum of 110 BPM at 14:18 D1. Heart beats, including ectopy, totaled [id number] beats. VENTRICULAR ECTOPICS totaled 8 averaging 0.2 per hour with 8 single, 0 paired, 0 trigeminy and 0 R on T.  SUPRAVENTRICULAR ECTOPICS totaled 491 averaging 10.3 per hour ,with 352 single and 36 paired beats. SUPRAVENTRICULAR TACHYCARDIA occurred 17 times. The fastest run was at 165 BPM and occurred at 13:39 D1 with 3 beats. The longest run was 17 beats at 02:42 D2 at a rate of 117 BPM.  Patient diary was submitted, no symptoms noted. No patient triggered events noted. ECHO (06/20)  Left Ventricle  Normal cavity size, wall thickness, systolic function (ejection fraction normal) and diastolic function.  The estimated ejection fraction is 55 - 60%. Visually measured ejection fraction. LV wall motion is noted to be no regional wall motion abnormality. Wall Scoring  The left ventricular wall motion is normal.             Left Atrium  Normal cavity size. Left Atrium volume index is 25.43 mL/m2. Right Ventricle  Normal cavity size and global systolic function. Right Atrium  Normal cavity size. Aortic Valve  Probably trileaflet aortic valve. There is leaflet calcification. Aortic valve peak gradient is 34.5 mmHg. Aortic valve mean gradient is 18.9 mmHg. Aortic valve area is 1.1 cm2. Aortic valve peak velocity is 294 cm/s. There is mild to moderate aortic stenosis. Trace aortic valve regurgitation. Mitral Valve  No stenosis. Mitral valve non-specific thickening. Trace regurgitation. Tricuspid Valve  Normal valve structure and no stenosis. Trace regurgitation. Pulmonic Valve  Normal valve structure, no stenosis and no regurgitation. Aorta  Normal aortic root and ascending aorta. Pulmonary Artery  Pulmonary arterial systolic pressure (PASP) is 31 mmHg. Pulmonary hypertension not suggested by Doppler findings. STRESS TEST (06/19)  Baseline ECG: Sinus bradycardia, non-specific ST-T wave abnormalities. Inconclusive stress test.  Gated SPECT: Left ventricular function post-stress was normal. Calculated ejection fraction is 69%. Left ventricular perfusion is probably normal.  There was no convincing evidence of significant reversible defect to suggest on going major ischemia. Inferior defect is most consistent with diaphragmatic attenuation artifact  Myocardial perfusion imaging supports a low risk stress test.     IMPRESSION & PLAN:  Ms. Nnamdi Morgan is 81 y.o. female with hyperlipidemia, obesity, remote history of tobacco abuse disorder    Aortic stenosis  Echo in June 2019 showed mild aortic stenosis with mean gradient of 14 mmHg. Echo in June 2020 with mean aortic valve gradient of 19 mmHg.   Echo in 12/2021 with Peak gradient 30 and mean gradient of 21 mmHg, mild to moderate AS  Currently no symptoms of angina, syncope or heart failure. We recommend to continue clinical observation. Will repeat echo in 9 months    Hyperlipidemia:  Currently she is on atorvastatin 40 mg daily. Last LDL was 100. Continue same. This is being checked by PCP    This plan was discussed with patient who is in agreement. Thank you for allowing me to participate in patient care. Please feel free to call me if you have any question or concern. Kristi Lesch, MD  Please note: This document has been produced using voice recognition software. Unrecognized errors in transcription may be present.

## 2023-03-01 PROBLEM — N28.1 RENAL CYST: Status: ACTIVE | Noted: 2023-03-01

## 2023-05-30 RX ORDER — METOPROLOL SUCCINATE 25 MG/1
TABLET, EXTENDED RELEASE ORAL
Qty: 90 TABLET | Refills: 3 | Status: SHIPPED | OUTPATIENT
Start: 2023-05-30

## 2023-07-05 DIAGNOSIS — I35.0 NONRHEUMATIC AORTIC (VALVE) STENOSIS: Primary | ICD-10-CM

## 2023-07-10 NOTE — TELEPHONE ENCOUNTER
PCP:  Jozef Lovett MD    Last appt:  10/20/2022   Future Appointments   Date Time Provider [address]   7/20/2023 11:00 AM [facility] ECHO 1 [facility] BS AMB   7/27/2023 10:15 AM Lily Ayon MD [facility] Sched   10/24/2023 10:30 AM Nasima Morelos MD [facility] AMB   12/6/2023 10:00 AM Lily Ayon MD [facility] Zari Betts       Requested Prescriptions     Pending Prescriptions Disp Refills    losartan (COZAAR) 25 MG tablet [Pharmacy Med Name: LOSARTAN POTASSIUM 25 MG TAB] 90 tablet 3     Sig: TAKE 1 TABLET BY MOUTH EVERY DAY

## 2023-07-11 NOTE — PATIENT DISCUSSION
Discussed lid hygiene, warm compress and eyelid wash. Clofazimine Counseling:  I discussed with the patient the risks of clofazimine including but not limited to skin and eye pigmentation, liver damage, nausea/vomiting, gastrointestinal bleeding and allergy.

## 2023-07-12 RX ORDER — LOSARTAN POTASSIUM 25 MG/1
TABLET ORAL
Qty: 90 TABLET | Refills: 3 | Status: SHIPPED | OUTPATIENT
Start: 2023-07-12

## 2023-08-18 ENCOUNTER — FOLLOW UP (OUTPATIENT)
Dept: RURAL CLINIC 1 | Facility: CLINIC | Age: 73
End: 2023-08-18

## 2023-08-18 DIAGNOSIS — H25.13: ICD-10-CM

## 2023-08-18 PROCEDURE — 92014 COMPRE OPH EXAM EST PT 1/>: CPT

## 2023-08-18 ASSESSMENT — VISUAL ACUITY
OS_CC: 20/30
OU_CC: 20/20
OD_PH: 20/30
OD_CC: 20/30

## 2023-08-18 ASSESSMENT — TONOMETRY
OD_IOP_MMHG: 15
OS_IOP_MMHG: 15

## 2023-10-24 ENCOUNTER — OFFICE VISIT (OUTPATIENT)
Age: 73
End: 2023-10-24
Payer: MEDICARE

## 2023-10-24 VITALS
HEART RATE: 58 BPM | OXYGEN SATURATION: 98 % | WEIGHT: 187 LBS | BODY MASS INDEX: 35.33 KG/M2 | DIASTOLIC BLOOD PRESSURE: 72 MMHG | SYSTOLIC BLOOD PRESSURE: 131 MMHG

## 2023-10-24 DIAGNOSIS — I35.0 NONRHEUMATIC AORTIC (VALVE) STENOSIS: Primary | ICD-10-CM

## 2023-10-24 DIAGNOSIS — R07.9 CHEST PAIN, UNSPECIFIED TYPE: ICD-10-CM

## 2023-10-24 DIAGNOSIS — I10 ESSENTIAL (PRIMARY) HYPERTENSION: ICD-10-CM

## 2023-10-24 PROCEDURE — 1090F PRES/ABSN URINE INCON ASSESS: CPT | Performed by: INTERNAL MEDICINE

## 2023-10-24 PROCEDURE — 99214 OFFICE O/P EST MOD 30 MIN: CPT | Performed by: INTERNAL MEDICINE

## 2023-10-24 PROCEDURE — G8417 CALC BMI ABV UP PARAM F/U: HCPCS | Performed by: INTERNAL MEDICINE

## 2023-10-24 PROCEDURE — 1036F TOBACCO NON-USER: CPT | Performed by: INTERNAL MEDICINE

## 2023-10-24 PROCEDURE — 1123F ACP DISCUSS/DSCN MKR DOCD: CPT | Performed by: INTERNAL MEDICINE

## 2023-10-24 PROCEDURE — G8399 PT W/DXA RESULTS DOCUMENT: HCPCS | Performed by: INTERNAL MEDICINE

## 2023-10-24 PROCEDURE — G8484 FLU IMMUNIZE NO ADMIN: HCPCS | Performed by: INTERNAL MEDICINE

## 2023-10-24 PROCEDURE — 3017F COLORECTAL CA SCREEN DOC REV: CPT | Performed by: INTERNAL MEDICINE

## 2023-10-24 PROCEDURE — G8428 CUR MEDS NOT DOCUMENT: HCPCS | Performed by: INTERNAL MEDICINE

## 2023-10-24 NOTE — PROGRESS NOTES
[facility]    Caron Flores is 72 y.o. female     Patient denies any hospital admission or ER visit since last time  Patient had echocardiogram done  Patient tells me that she has some mild dyspnea on moderate to severe exertion, somewhat unchanged  Lately she has been experiencing some chest discomfort which she describes as a chest pressure sensation lasting for several minutes especially at nighttime. During the daytime she does not report significant chest pain or chest pressure. No nausea vomiting diaphoresis. She has exertional dyspnea. No presyncope or syncope. Denies any nausea, vomiting, abdominal pain, fever, chills, sputum production. No hematuria or other bleeding complaints    Past Medical History:   Diagnosis Date    Arthritis     Common migraine     HLD (hyperlipidemia)     Hx of vertigo     Left renal mass     Lung nodule     Renal cyst     Renal cyst 3/1/2023    Tobacco abuse, in remission        Review of Systems:  Cardiac symptoms as noted above in HPI. All others negative. Current Outpatient Medications   Medication Sig    allopurinol (ZYLOPRIM) 300 MG tablet Take 1 tablet by mouth daily    losartan (COZAAR) 25 MG tablet TAKE 1 TABLET BY MOUTH EVERY DAY    metoprolol succinate (TOPROL XL) 25 MG extended release tablet TAKE 1 TABLET BY MOUTH EVERY DAY    amoxicillin (AMOXIL) 500 MG capsule TAKE 1 CAPSULE EVERY 8 HOURS --SAVE ONE PILL FOR DAY OF STENT REMOVAL    ibuprofen (ADVIL;MOTRIN) 600 MG tablet Take 1 tablet by mouth every 6 hours as needed    tamsulosin (FLOMAX) 0.4 MG capsule TAKE 1 CAPSULE BY MOUTH EVERY DAY AFTER SUPPER    albuterol sulfate HFA (PROVENTIL;VENTOLIN;PROAIR) 108 (90 Base) MCG/ACT inhaler Inhale 2 puffs into the lungs every 4 hours as needed    alendronate (FOSAMAX) 70 MG tablet TAKE 1 TABLET BY MOUTH EVERY 7 DAYS. Take with a full glass of water on an empty stomach.  Remain upright x 1 hour
and added to chart.      Medication's patient's would liked removed has been marked not taking to be removed per Verbal order and read back per Leann Landon MD

## 2023-10-24 NOTE — PATIENT INSTRUCTIONS
Testing    Nuclear Stress    Nuclear Stress Instructions-Nothing to eat or drink past midnight and no medicaitons the morning of cardiac testing. **call office 3-5 days after testing is completed for results** Please ensure testing is completed prior to scheduled follow up appointment.  If it is not completed your appointment may be rescheduled**

## 2024-01-31 ENCOUNTER — FOLLOW UP (OUTPATIENT)
Dept: RURAL CLINIC 1 | Facility: CLINIC | Age: 74
End: 2024-01-31

## 2024-01-31 DIAGNOSIS — H25.13: ICD-10-CM

## 2024-01-31 PROCEDURE — 92014 COMPRE OPH EXAM EST PT 1/>: CPT

## 2024-01-31 ASSESSMENT — TONOMETRY
OS_IOP_MMHG: 15
OD_IOP_MMHG: 15

## 2024-01-31 ASSESSMENT — VISUAL ACUITY
OS_CC: 20/60
OD_CC: 20/70

## 2024-02-08 ENCOUNTER — CONSULTATION/EVALUATION (OUTPATIENT)
Dept: RURAL CLINIC 1 | Facility: CLINIC | Age: 74
End: 2024-02-08

## 2024-02-08 DIAGNOSIS — H25.813: ICD-10-CM

## 2024-02-08 PROCEDURE — 92025 CPTRIZED CORNEAL TOPOGRAPHY: CPT

## 2024-02-08 PROCEDURE — 92134 CPTRZ OPH DX IMG PST SGM RTA: CPT

## 2024-02-08 PROCEDURE — 92136 OPHTHALMIC BIOMETRY: CPT

## 2024-02-08 PROCEDURE — 99214 OFFICE O/P EST MOD 30 MIN: CPT

## 2024-02-08 ASSESSMENT — VISUAL ACUITY
OD_CC: 20/60
OS_CC: 20/80
OD_CC: 20/60
OD_SC: 20/200
OU_CC: 20/60
OS_CC: 20/60
OS_AM: 20/40
OU_CC: 20/50
OU_SC: 20/200
OD_BAT: 20/400
OD_PAM: 20/40
OS_SC: 20/200
OS_BAT: 20/400

## 2024-02-08 ASSESSMENT — TONOMETRY
OS_IOP_MMHG: 15
OD_IOP_MMHG: 15

## 2024-03-25 ENCOUNTER — PRE-OP/H&P (OUTPATIENT)
Dept: RURAL CLINIC 1 | Facility: CLINIC | Age: 74
End: 2024-03-25

## 2024-03-25 VITALS — HEIGHT: 61 IN | DIASTOLIC BLOOD PRESSURE: 80 MMHG | HEART RATE: 64 BPM | SYSTOLIC BLOOD PRESSURE: 140 MMHG

## 2024-03-25 DIAGNOSIS — H25.813: ICD-10-CM

## 2024-03-25 PROCEDURE — 99499 UNLISTED E&M SERVICE: CPT

## 2024-03-29 RX ORDER — METOPROLOL SUCCINATE 25 MG/1
TABLET, EXTENDED RELEASE ORAL
Qty: 90 TABLET | Refills: 3 | Status: SHIPPED | OUTPATIENT
Start: 2024-03-29

## 2024-04-02 ENCOUNTER — SURGERY/PROCEDURE (OUTPATIENT)
Dept: URBAN - METROPOLITAN AREA SURGERY 3 | Facility: SURGERY | Age: 74
End: 2024-04-02

## 2024-04-02 DIAGNOSIS — H25.812: ICD-10-CM

## 2024-04-02 PROCEDURE — 68841 INSJ RX ELUT IMPLT LAC CANAL: CPT | Mod: 51,LT

## 2024-04-02 PROCEDURE — 66984 XCAPSL CTRC RMVL W/O ECP: CPT

## 2024-04-03 ENCOUNTER — POST-OP (OUTPATIENT)
Dept: RURAL CLINIC 1 | Facility: CLINIC | Age: 74
End: 2024-04-03

## 2024-04-03 DIAGNOSIS — Z96.1: ICD-10-CM

## 2024-04-03 PROCEDURE — 99024 POSTOP FOLLOW-UP VISIT: CPT

## 2024-04-03 ASSESSMENT — TONOMETRY: OS_IOP_MMHG: 16

## 2024-04-03 ASSESSMENT — VISUAL ACUITY: OS_SC: 20/50

## 2024-04-10 ENCOUNTER — POST-OP (OUTPATIENT)
Dept: RURAL CLINIC 1 | Facility: CLINIC | Age: 74
End: 2024-04-10

## 2024-04-10 DIAGNOSIS — H25.811: ICD-10-CM

## 2024-04-10 PROCEDURE — 99213 OFFICE O/P EST LOW 20 MIN: CPT | Mod: 24

## 2024-04-10 ASSESSMENT — TONOMETRY
OD_IOP_MMHG: 16
OS_IOP_MMHG: 16

## 2024-04-10 ASSESSMENT — VISUAL ACUITY
OS_SC: 20/40
OD_SC: 20/200

## 2024-04-16 ENCOUNTER — SURGERY/PROCEDURE (OUTPATIENT)
Dept: URBAN - METROPOLITAN AREA SURGERY 3 | Facility: SURGERY | Age: 74
End: 2024-04-16

## 2024-04-16 DIAGNOSIS — H25.811: ICD-10-CM

## 2024-04-16 PROCEDURE — 66984 XCAPSL CTRC RMVL W/O ECP: CPT | Mod: 79,RT

## 2024-04-16 PROCEDURE — 68841 INSJ RX ELUT IMPLT LAC CANAL: CPT | Mod: 51,RT,79,RT

## 2024-04-17 ENCOUNTER — POST-OP (OUTPATIENT)
Dept: RURAL CLINIC 1 | Facility: CLINIC | Age: 74
End: 2024-04-17

## 2024-04-17 DIAGNOSIS — Z96.1: ICD-10-CM

## 2024-04-17 PROCEDURE — 99024 POSTOP FOLLOW-UP VISIT: CPT

## 2024-04-17 ASSESSMENT — VISUAL ACUITY
OU_SC: 20/30
OS_SC: 20/30
OD_SC: 20/40

## 2024-04-17 ASSESSMENT — TONOMETRY: OD_IOP_MMHG: 20

## 2024-04-25 ENCOUNTER — POST-OP (OUTPATIENT)
Dept: RURAL CLINIC 1 | Facility: CLINIC | Age: 74
End: 2024-04-25

## 2024-04-25 DIAGNOSIS — Z96.1: ICD-10-CM

## 2024-04-25 PROCEDURE — 99024 POSTOP FOLLOW-UP VISIT: CPT

## 2024-04-25 ASSESSMENT — VISUAL ACUITY
OS_SC: 20/40
OU_SC: 20/20
OU_SC: 20/70
OD_SC: 20/70
OD_SC: 20/25
OS_SC: 20/70

## 2024-04-25 ASSESSMENT — TONOMETRY
OS_IOP_MMHG: 17
OD_IOP_MMHG: 16

## 2024-05-13 RX ORDER — LOSARTAN POTASSIUM 25 MG/1
TABLET ORAL
Qty: 90 TABLET | Refills: 3 | OUTPATIENT
Start: 2024-05-13

## 2024-07-08 RX ORDER — LOSARTAN POTASSIUM 25 MG/1
TABLET ORAL
Qty: 90 TABLET | Refills: 3 | Status: SHIPPED | OUTPATIENT
Start: 2024-07-08

## 2024-08-19 ENCOUNTER — FOLLOW UP (OUTPATIENT)
Dept: RURAL CLINIC 1 | Facility: CLINIC | Age: 74
End: 2024-08-19

## 2024-08-19 DIAGNOSIS — H35.371: ICD-10-CM

## 2024-08-19 DIAGNOSIS — H26.493: ICD-10-CM

## 2024-08-19 DIAGNOSIS — Z96.1: ICD-10-CM

## 2024-08-19 PROCEDURE — 92014 COMPRE OPH EXAM EST PT 1/>: CPT

## 2024-08-19 ASSESSMENT — TONOMETRY
OD_IOP_MMHG: 16
OS_IOP_MMHG: 16

## 2024-08-19 ASSESSMENT — VISUAL ACUITY
OS_SC: 20/60
OD_SC: 20/100
OU_SC: 20/25
OD_SC: 20/25
OS_SC: 20/100
OU_SC: 20/100

## 2024-10-24 ENCOUNTER — OFFICE VISIT (OUTPATIENT)
Age: 74
End: 2024-10-24
Payer: MEDICARE

## 2024-10-24 VITALS
OXYGEN SATURATION: 93 % | HEART RATE: 60 BPM | SYSTOLIC BLOOD PRESSURE: 136 MMHG | HEIGHT: 61 IN | DIASTOLIC BLOOD PRESSURE: 75 MMHG | BODY MASS INDEX: 34.36 KG/M2 | WEIGHT: 182 LBS

## 2024-10-24 DIAGNOSIS — I48.21 PERMANENT ATRIAL FIBRILLATION (HCC): ICD-10-CM

## 2024-10-24 DIAGNOSIS — I35.0 AORTIC VALVE STENOSIS, ETIOLOGY OF CARDIAC VALVE DISEASE UNSPECIFIED: ICD-10-CM

## 2024-10-24 DIAGNOSIS — I10 ESSENTIAL (PRIMARY) HYPERTENSION: Primary | ICD-10-CM

## 2024-10-24 DIAGNOSIS — R07.9 CHEST PAIN, UNSPECIFIED TYPE: ICD-10-CM

## 2024-10-24 PROCEDURE — 3078F DIAST BP <80 MM HG: CPT | Performed by: INTERNAL MEDICINE

## 2024-10-24 PROCEDURE — 3075F SYST BP GE 130 - 139MM HG: CPT | Performed by: INTERNAL MEDICINE

## 2024-10-24 PROCEDURE — 99214 OFFICE O/P EST MOD 30 MIN: CPT | Performed by: INTERNAL MEDICINE

## 2024-10-24 PROCEDURE — 1036F TOBACCO NON-USER: CPT | Performed by: INTERNAL MEDICINE

## 2024-10-24 PROCEDURE — 1126F AMNT PAIN NOTED NONE PRSNT: CPT | Performed by: INTERNAL MEDICINE

## 2024-10-24 PROCEDURE — 3017F COLORECTAL CA SCREEN DOC REV: CPT | Performed by: INTERNAL MEDICINE

## 2024-10-24 PROCEDURE — G8484 FLU IMMUNIZE NO ADMIN: HCPCS | Performed by: INTERNAL MEDICINE

## 2024-10-24 PROCEDURE — G8427 DOCREV CUR MEDS BY ELIG CLIN: HCPCS | Performed by: INTERNAL MEDICINE

## 2024-10-24 PROCEDURE — 93000 ELECTROCARDIOGRAM COMPLETE: CPT | Performed by: INTERNAL MEDICINE

## 2024-10-24 PROCEDURE — G8399 PT W/DXA RESULTS DOCUMENT: HCPCS | Performed by: INTERNAL MEDICINE

## 2024-10-24 PROCEDURE — G8417 CALC BMI ABV UP PARAM F/U: HCPCS | Performed by: INTERNAL MEDICINE

## 2024-10-24 PROCEDURE — 1159F MED LIST DOCD IN RCRD: CPT | Performed by: INTERNAL MEDICINE

## 2024-10-24 PROCEDURE — 1123F ACP DISCUSS/DSCN MKR DOCD: CPT | Performed by: INTERNAL MEDICINE

## 2024-10-24 PROCEDURE — 1090F PRES/ABSN URINE INCON ASSESS: CPT | Performed by: INTERNAL MEDICINE

## 2024-10-24 RX ORDER — ASPIRIN 81 MG/1
81 TABLET ORAL DAILY
Qty: 90 TABLET | Refills: 0 | Status: SHIPPED | OUTPATIENT
Start: 2024-10-24

## 2024-10-24 NOTE — PROGRESS NOTES
Identified pt with two pt identifiers(name and ). Reviewed record in preparation for visit and have obtained necessary documentation.    Latasha Osorio presents today for   Chief Complaint   Patient presents with    Follow-up      1 year post NST         Pt c/o CHEST PAIN/ PRESSURE, FATIGUE            Latasha Osorio preferred language for health care discussion is english/other.    Personal Protective Equipment:   Personal Protective Equipment was used including: mask-surgical and hands-gloves. Patient was placed on no precaution(s). Patient was not masked.    Precautions:   Patient currently on None  Patient currently roomed with door closed.    Is someone accompanying this pt? no    Is the patient using any DME equipment during OV? no    Depression Screening:      10/20/2022    10:49 AM 2022    10:37 AM 2022    10:41 AM   PHQ-9 Questionaire   Little interest or pleasure in doing things 0 0 0   Feeling down, depressed, or hopeless 0 0 0   PHQ-9 Total Score 0 0 0        Learning Assessment:  completed    Abuse Screening:      10/24/2024    10:00 AM 10/24/2023    11:00 AM   AMB Abuse Screening   Do you ever feel afraid of your partner? N N   Are you in a relationship with someone who physically or mentally threatens you? N N   Is it safe for you to go home? Y Y          Fall Risk      10/24/2024    10:52 AM 10/24/2023    11:33 AM   Fall Risk   Do you feel unsteady or are you worried about falling?  no no   2 or more falls in past year? no no   Fall with injury in past year? no no         Pt currently taking Anticoagulant /Antiplatelet therapy? no    Coordination of Care:  1. Have you been to the ER, urgent care clinic since your last visit? Hospitalized since your last visit? no    2. Have you seen or consulted any other health care providers outside of the [facility] since your last visit? Include any pap smears or colon screening. no      Please see Red banners under Allergies and Med

## 2024-10-24 NOTE — PROGRESS NOTES
[facility]    Latasha Osorio is 74 y.o. female     Patient is here today for follow-up appointment  No significant chest pain or chest tightness.  She has been having some palpitation especially more aware at the nighttime.  She feels like her heart is fluttering lasting for few minutes.  No presyncope or syncope.  Occasional back pain.  Patient has asthma and uses inhalers for shortness of breath which helps  No presyncope or syncope.  Denies any nausea, vomiting, abdominal pain, fever, chills, sputum production. No hematuria or other bleeding complaints    Past Medical History:   Diagnosis Date    Aortic stenosis     Arthritis     Common migraine     HLD (hyperlipidemia)     Hx of vertigo     Left renal mass     Lung nodule     Renal cyst     Renal cyst 03/01/2023    Tobacco abuse, in remission        Review of Systems:  Cardiac symptoms as noted above in HPI. All others negative.    Current Outpatient Medications   Medication Sig    losartan (COZAAR) 25 MG tablet TAKE 1 TABLET BY MOUTH EVERY DAY    metoprolol succinate (TOPROL XL) 25 MG extended release tablet TAKE 1 TABLET BY MOUTH EVERY DAY    atorvastatin (LIPITOR) 40 MG tablet TAKE 1 TABLET BY MOUTH EVERYDAY AT BEDTIME    nitroGLYCERIN (NITROSTAT) 0.4 MG SL tablet PLACE 1 TABLET SUBLINGUAL EVERY 5 MIN AS NEEDED FOR CHEST PAIN. IF MORE THAN 3 DOSES NEEDED, [id number]    allopurinol (ZYLOPRIM) 300 MG tablet TAKE 1 TABLET BY MOUTH EVERY DAY    ciprofloxacin (CILOXAN) 0.3 % ophthalmic solution PLEASE SEE ATTACHED FOR DETAILED DIRECTIONS    ketorolac (ACULAR) 0.5 % ophthalmic solution PLEASE SEE ATTACHED FOR DETAILED DIRECTIONS    albuterol sulfate HFA (PROVENTIL;VENTOLIN;PROAIR) 108 (90 Base) MCG/ACT inhaler Inhale 2 puffs into the lungs every 4 hours as needed    alendronate (FOSAMAX) 70 MG tablet TAKE 1 TABLET BY MOUTH EVERY 7 DAYS. Take with a full glass of water on an empty stomach. Remain

## 2024-10-24 NOTE — PATIENT INSTRUCTIONS
Echo in 9 months   PATIENT PREPS:   -Wear easy to remove shirt to your appointment for easier accessibility.      **call office 3-5 days after testing is completed for results** Please ensure testing is completed prior to scheduled follow up appointment. If it is not completed your appointment may be rescheduled**    New Medication/Medication Changes/Medication Refill  Aspirin 81 mg tab daily     **please allow 24-48 hrs for medication to be escribed to pharmacy** If you need any refills on medications please contact your pharmacy so that the request can be escribed to the provider for review seven to 10 days prior to being out of medication.        Other Testing  [facility]-(14 days)may be calling you to confirm your address to send your Heart Monitor.   Please allow 7-10 business days for monitor to arrive at your home.  Customer Service for [employer]:  [phone]

## 2025-04-11 RX ORDER — METOPROLOL SUCCINATE 25 MG/1
25 TABLET, EXTENDED RELEASE ORAL DAILY
Qty: 90 TABLET | Refills: 3 | Status: SHIPPED | OUTPATIENT
Start: 2025-04-11

## 2025-05-27 RX ORDER — LOSARTAN POTASSIUM 25 MG/1
25 TABLET ORAL DAILY
Qty: 90 TABLET | Refills: 3 | Status: SHIPPED | OUTPATIENT
Start: 2025-05-27

## 2025-05-27 NOTE — TELEPHONE ENCOUNTER
PCP: Lana Santacruz MD    Last appt:  10/24/2024   Future Appointments   Date Time Provider Department Center   7/31/2025 11:00 AM [facility] ECHO 1 [facility]   7/31/2025 11:45 AM Jewel Johnson MD [facility]   4/29/2026 11:00 AM Dony Dyson MD [facility] Sched       Requested Prescriptions     Pending Prescriptions Disp Refills    losartan (COZAAR) 25 MG tablet [Pharmacy Med Name: LOSARTAN POTASSIUM 25 MG TAB] 90 tablet 3     Sig: TAKE 1 TABLET BY MOUTH EVERY DAY       Request for a 30 or 90 day supply? Provider Discretion    Pharmacy: confirmed     Other Comments:n/a

## 2025-08-25 ENCOUNTER — COMPREHENSIVE EXAM (OUTPATIENT)
Age: 75
End: 2025-08-25

## 2025-08-25 DIAGNOSIS — H35.371: ICD-10-CM

## 2025-08-25 DIAGNOSIS — Z96.1: ICD-10-CM

## 2025-08-25 DIAGNOSIS — H26.493: ICD-10-CM

## 2025-08-25 DIAGNOSIS — H43.823: ICD-10-CM

## 2025-08-25 PROCEDURE — 92134 CPTRZ OPH DX IMG PST SGM RTA: CPT

## 2025-08-25 PROCEDURE — 92014 COMPRE OPH EXAM EST PT 1/>: CPT

## 2025-08-26 ENCOUNTER — OFFICE VISIT (OUTPATIENT)
Age: 75
End: 2025-08-26

## 2025-08-26 VITALS
BODY MASS INDEX: 34.93 KG/M2 | SYSTOLIC BLOOD PRESSURE: 145 MMHG | OXYGEN SATURATION: 98 % | HEIGHT: 61 IN | DIASTOLIC BLOOD PRESSURE: 66 MMHG | HEART RATE: 56 BPM | WEIGHT: 185 LBS

## 2025-08-26 DIAGNOSIS — I35.0 AORTIC VALVE STENOSIS, ETIOLOGY OF CARDIAC VALVE DISEASE UNSPECIFIED: ICD-10-CM

## 2025-08-26 DIAGNOSIS — Z01.818 PRE-OP TESTING: ICD-10-CM

## 2025-08-26 DIAGNOSIS — R07.9 CHEST PAIN, UNSPECIFIED TYPE: Primary | ICD-10-CM

## (undated) DEVICE — BASIN EMESIS 500CC ROSE 250/CS 60/PLT: Brand: MEDEGEN MEDICAL PRODUCTS, LLC

## (undated) DEVICE — KIT COLON W/ 1.1OZ LUB AND 2 END

## (undated) DEVICE — DEVICE INFL 60ML 12ATM CONVENIENT LOK REL HNDL HI PRSS FLX

## (undated) DEVICE — FLEX ADVANTAGE 1500CC: Brand: FLEX ADVANTAGE

## (undated) DEVICE — MEDI-VAC NON-CONDUCTIVE SUCTION TUBING: Brand: CARDINAL HEALTH

## (undated) DEVICE — SYR 50ML SLIP TIP NSAF LF STRL --

## (undated) DEVICE — KENDALL 500 SERIES DIAPHORETIC FOAM MONITORING ELECTRODE - TEAR DROP SHAPE ( 30/PK): Brand: KENDALL